# Patient Record
Sex: FEMALE | Race: WHITE | NOT HISPANIC OR LATINO | Employment: OTHER | URBAN - NONMETROPOLITAN AREA
[De-identification: names, ages, dates, MRNs, and addresses within clinical notes are randomized per-mention and may not be internally consistent; named-entity substitution may affect disease eponyms.]

---

## 2017-02-14 RX ORDER — HYDROCHLOROTHIAZIDE 25 MG/1
TABLET ORAL
Qty: 90 TABLET | Refills: 0 | Status: SHIPPED | OUTPATIENT
Start: 2017-02-14 | End: 2018-05-11 | Stop reason: HOSPADM

## 2017-08-11 RX ORDER — HYDROCHLOROTHIAZIDE 25 MG/1
TABLET ORAL
Qty: 90 TABLET | Refills: 0 | OUTPATIENT
Start: 2017-08-11

## 2018-01-01 ENCOUNTER — TELEPHONE (OUTPATIENT)
Dept: FAMILY MEDICINE CLINIC | Facility: CLINIC | Age: 78
End: 2018-01-01

## 2018-01-01 ENCOUNTER — DOCUMENTATION (OUTPATIENT)
Dept: FAMILY MEDICINE CLINIC | Facility: CLINIC | Age: 78
End: 2018-01-01

## 2018-01-01 DIAGNOSIS — K59.00 CONSTIPATION, UNSPECIFIED CONSTIPATION TYPE: Primary | ICD-10-CM

## 2018-01-01 DIAGNOSIS — I10 ESSENTIAL HYPERTENSION: Primary | Chronic | ICD-10-CM

## 2018-01-01 RX ORDER — NYSTATIN 100000 [USP'U]/G
POWDER TOPICAL 2 TIMES DAILY
Qty: 120 G | Refills: 5 | OUTPATIENT
Start: 2018-01-01

## 2018-01-01 RX ORDER — LISINOPRIL 10 MG/1
10 TABLET ORAL
Qty: 90 TABLET | Refills: 3 | Status: SHIPPED | OUTPATIENT
Start: 2018-01-01

## 2018-01-01 RX ORDER — BISACODYL 10 MG
10 SUPPOSITORY, RECTAL RECTAL DAILY
Qty: 28 EACH | Refills: 5 | Status: SHIPPED | OUTPATIENT
Start: 2018-01-01

## 2018-01-01 RX ORDER — POLYETHYLENE GLYCOL 3350 17 G/17G
17 POWDER, FOR SOLUTION ORAL DAILY
Qty: 30 EACH | Refills: 5 | Status: SHIPPED | OUTPATIENT
Start: 2018-01-01

## 2018-01-01 RX ORDER — MIRTAZAPINE 15 MG/1
TABLET, FILM COATED ORAL
Qty: 30 TABLET | Refills: 5 | OUTPATIENT
Start: 2018-01-01

## 2018-01-01 RX ORDER — MIRABEGRON 25 MG/1
25 TABLET, FILM COATED, EXTENDED RELEASE ORAL DAILY
Qty: 30 TABLET | Refills: 5 | OUTPATIENT
Start: 2018-01-01

## 2018-01-01 RX ORDER — NYSTATIN 100000 U/G
CREAM TOPICAL
Qty: 30 G | Refills: 5 | OUTPATIENT
Start: 2018-01-01

## 2018-02-02 RX ORDER — PEN NEEDLE, DIABETIC 31 GX3/16"
NEEDLE, DISPOSABLE MISCELLANEOUS
Refills: 11 | OUTPATIENT
Start: 2018-02-02

## 2018-04-29 ENCOUNTER — HOSPITAL ENCOUNTER (INPATIENT)
Facility: HOSPITAL | Age: 78
LOS: 12 days | Discharge: HOME HEALTH | End: 2018-05-11
Attending: INTERNAL MEDICINE | Admitting: FAMILY MEDICINE

## 2018-04-29 DIAGNOSIS — I11.9 BENIGN HYPERTENSIVE HEART DISEASE WITHOUT HEART FAILURE: ICD-10-CM

## 2018-04-29 DIAGNOSIS — Z78.9 IMPAIRED MOBILITY AND ADLS: ICD-10-CM

## 2018-04-29 DIAGNOSIS — I10 BENIGN HYPERTENSION: ICD-10-CM

## 2018-04-29 DIAGNOSIS — R07.9 CHEST PAIN, UNSPECIFIED TYPE: ICD-10-CM

## 2018-04-29 DIAGNOSIS — Z74.09 IMPAIRED PHYSICAL MOBILITY: ICD-10-CM

## 2018-04-29 DIAGNOSIS — R33.9 CHRONIC RETENTION OF URINE: Primary | ICD-10-CM

## 2018-04-29 DIAGNOSIS — A41.4 SEPSIS DUE TO KLEBSIELLA PNEUMONIAE (HCC): ICD-10-CM

## 2018-04-29 DIAGNOSIS — Z74.09 IMPAIRED MOBILITY AND ADLS: ICD-10-CM

## 2018-04-29 PROBLEM — E66.9 OBESITY: Chronic | Status: ACTIVE | Noted: 2018-04-29

## 2018-04-29 PROBLEM — A41.9 SEPTIC SHOCK (HCC): Status: ACTIVE | Noted: 2018-04-29

## 2018-04-29 PROBLEM — N18.30 CKD (CHRONIC KIDNEY DISEASE) STAGE 3, GFR 30-59 ML/MIN (HCC): Chronic | Status: ACTIVE | Noted: 2018-04-29

## 2018-04-29 PROBLEM — A41.9 SEPSIS (HCC): Status: ACTIVE | Noted: 2018-04-29

## 2018-04-29 PROBLEM — N39.0 UTI (URINARY TRACT INFECTION): Status: ACTIVE | Noted: 2018-04-29

## 2018-04-29 PROBLEM — E11.9 DIABETES MELLITUS (HCC): Chronic | Status: ACTIVE | Noted: 2018-04-29

## 2018-04-29 PROBLEM — R65.21 SEPTIC SHOCK (HCC): Status: ACTIVE | Noted: 2018-04-29

## 2018-04-29 PROCEDURE — 94799 UNLISTED PULMONARY SVC/PX: CPT

## 2018-04-29 PROCEDURE — 94760 N-INVAS EAR/PLS OXIMETRY 1: CPT

## 2018-04-29 RX ORDER — QUETIAPINE FUMARATE 25 MG/1
25 TABLET, FILM COATED ORAL 2 TIMES DAILY
COMMUNITY
Start: 2018-03-30 | End: 2018-05-11 | Stop reason: HOSPADM

## 2018-04-29 RX ORDER — TAMSULOSIN HYDROCHLORIDE 0.4 MG/1
0.4 CAPSULE ORAL DAILY
COMMUNITY
Start: 2018-04-27 | End: 2018-05-11 | Stop reason: HOSPADM

## 2018-04-29 RX ORDER — MAGNESIUM OXIDE 400 MG/1
800 TABLET ORAL 2 TIMES DAILY
Status: ON HOLD | COMMUNITY
End: 2018-04-30

## 2018-04-29 RX ORDER — HYDROCHLOROTHIAZIDE 25 MG/1
25 TABLET ORAL DAILY
COMMUNITY
Start: 2018-02-16 | End: 2018-05-11 | Stop reason: HOSPADM

## 2018-04-29 RX ORDER — CITALOPRAM 20 MG/1
20 TABLET ORAL DAILY
COMMUNITY
Start: 2018-03-23 | End: 2018-05-11 | Stop reason: HOSPADM

## 2018-04-29 RX ADMIN — NOREPINEPHRINE BITARTRATE 0.3 MCG/KG/MIN: 1 INJECTION INTRAVENOUS at 23:29

## 2018-04-30 ENCOUNTER — APPOINTMENT (OUTPATIENT)
Dept: ULTRASOUND IMAGING | Facility: HOSPITAL | Age: 78
End: 2018-04-30

## 2018-04-30 PROBLEM — E11.9 LONG-TERM INSULIN USE IN TYPE 2 DIABETES (HCC): Status: ACTIVE | Noted: 2018-04-29

## 2018-04-30 PROBLEM — B96.1 BACTEREMIA DUE TO KLEBSIELLA PNEUMONIAE: Status: ACTIVE | Noted: 2018-04-30

## 2018-04-30 PROBLEM — N17.9 ACUTE ON CHRONIC RENAL FAILURE (HCC): Chronic | Status: ACTIVE | Noted: 2018-04-30

## 2018-04-30 PROBLEM — R78.81 BACTEREMIA DUE TO KLEBSIELLA PNEUMONIAE: Status: ACTIVE | Noted: 2018-04-30

## 2018-04-30 PROBLEM — N18.9 ACUTE ON CHRONIC RENAL FAILURE (HCC): Chronic | Status: ACTIVE | Noted: 2018-04-30

## 2018-04-30 PROBLEM — Z79.4 LONG-TERM INSULIN USE IN TYPE 2 DIABETES (HCC): Status: ACTIVE | Noted: 2018-04-29

## 2018-04-30 LAB
ALBUMIN SERPL-MCNC: 2.6 G/DL (ref 3.4–4.8)
ALBUMIN SERPL-MCNC: 2.7 G/DL (ref 3.4–4.8)
ALBUMIN/GLOB SERPL: 0.8 G/DL (ref 1.1–1.8)
ALBUMIN/GLOB SERPL: 0.8 G/DL (ref 1.1–1.8)
ALP SERPL-CCNC: 68 U/L (ref 38–126)
ALP SERPL-CCNC: 74 U/L (ref 38–126)
ALT SERPL W P-5'-P-CCNC: 12 U/L (ref 9–52)
ALT SERPL W P-5'-P-CCNC: 30 U/L (ref 9–52)
ANION GAP SERPL CALCULATED.3IONS-SCNC: 14 MMOL/L (ref 5–15)
ANION GAP SERPL CALCULATED.3IONS-SCNC: 17 MMOL/L (ref 5–15)
AST SERPL-CCNC: 28 U/L (ref 14–36)
AST SERPL-CCNC: 34 U/L (ref 14–36)
BACTERIA BLD CULT: ABNORMAL
BACTERIA UR QL AUTO: ABNORMAL /HPF
BASOPHILS # BLD AUTO: 0.06 10*3/MM3 (ref 0–0.2)
BASOPHILS NFR BLD AUTO: 0.2 % (ref 0–2)
BILIRUB SERPL-MCNC: 0.4 MG/DL (ref 0.2–1.3)
BILIRUB SERPL-MCNC: 0.4 MG/DL (ref 0.2–1.3)
BILIRUB UR QL STRIP: ABNORMAL
BUN BLD-MCNC: 46 MG/DL (ref 7–21)
BUN BLD-MCNC: 50 MG/DL (ref 7–21)
BUN/CREAT SERPL: 18.9 (ref 7–25)
BUN/CREAT SERPL: 22.5 (ref 7–25)
CALCIUM SPEC-SCNC: 8.6 MG/DL (ref 8.4–10.2)
CALCIUM SPEC-SCNC: 8.7 MG/DL (ref 8.4–10.2)
CHLORIDE SERPL-SCNC: 103 MMOL/L (ref 95–110)
CHLORIDE SERPL-SCNC: 105 MMOL/L (ref 95–110)
CLARITY UR: ABNORMAL
CO2 SERPL-SCNC: 18 MMOL/L (ref 22–31)
CO2 SERPL-SCNC: 18 MMOL/L (ref 22–31)
COLOR UR: ABNORMAL
CREAT BLD-MCNC: 2.22 MG/DL (ref 0.5–1)
CREAT BLD-MCNC: 2.44 MG/DL (ref 0.5–1)
D-LACTATE SERPL-SCNC: 3.7 MMOL/L (ref 0.5–2)
D-LACTATE SERPL-SCNC: 5 MMOL/L (ref 0.5–2)
D-LACTATE SERPL-SCNC: 6.2 MMOL/L (ref 0.5–2)
DEPRECATED RDW RBC AUTO: 41.4 FL (ref 36.4–46.3)
DEPRECATED RDW RBC AUTO: 41.7 FL (ref 36.4–46.3)
EOSINOPHIL # BLD AUTO: 0.01 10*3/MM3 (ref 0–0.7)
EOSINOPHIL NFR BLD AUTO: 0 % (ref 0–7)
ERYTHROCYTE [DISTWIDTH] IN BLOOD BY AUTOMATED COUNT: 12.6 % (ref 11.5–14.5)
ERYTHROCYTE [DISTWIDTH] IN BLOOD BY AUTOMATED COUNT: 12.8 % (ref 11.5–14.5)
GFR SERPL CREATININE-BSD FRML MDRD: 19 ML/MIN/1.73 (ref 60–90)
GFR SERPL CREATININE-BSD FRML MDRD: 22 ML/MIN/1.73 (ref 39–90)
GLOBULIN UR ELPH-MCNC: 3.1 GM/DL (ref 2.3–3.5)
GLOBULIN UR ELPH-MCNC: 3.3 GM/DL (ref 2.3–3.5)
GLUCOSE BLD-MCNC: 248 MG/DL (ref 60–100)
GLUCOSE BLD-MCNC: 97 MG/DL (ref 60–100)
GLUCOSE BLDC GLUCOMTR-MCNC: 165 MG/DL (ref 70–130)
GLUCOSE BLDC GLUCOMTR-MCNC: 226 MG/DL (ref 70–130)
GLUCOSE BLDC GLUCOMTR-MCNC: 271 MG/DL (ref 70–130)
GLUCOSE BLDC GLUCOMTR-MCNC: 59 MG/DL (ref 70–130)
GLUCOSE BLDC GLUCOMTR-MCNC: 91 MG/DL (ref 70–130)
GLUCOSE UR STRIP-MCNC: NEGATIVE MG/DL
HCT VFR BLD AUTO: 36.8 % (ref 35–45)
HCT VFR BLD AUTO: 37 % (ref 35–45)
HGB BLD-MCNC: 12.5 G/DL (ref 12–15.5)
HGB BLD-MCNC: 12.5 G/DL (ref 12–15.5)
HGB UR QL STRIP.AUTO: ABNORMAL
HOLD SPECIMEN: NORMAL
HYALINE CASTS UR QL AUTO: ABNORMAL /LPF
IMM GRANULOCYTES # BLD: 3.24 10*3/MM3 (ref 0–0.02)
IMM GRANULOCYTES NFR BLD: 8.3 % (ref 0–0.5)
INR PPP: 1.41 (ref 0.8–1.2)
INR PPP: 1.44 (ref 0.8–1.2)
KETONES UR QL STRIP: ABNORMAL
LEUKOCYTE ESTERASE UR QL STRIP.AUTO: ABNORMAL
LYMPHOCYTES # BLD AUTO: 1.46 10*3/MM3 (ref 0.6–4.2)
LYMPHOCYTES # BLD MANUAL: 0.91 10*3/MM3 (ref 0.6–4.2)
LYMPHOCYTES NFR BLD AUTO: 3.7 % (ref 10–50)
LYMPHOCYTES NFR BLD MANUAL: 11 % (ref 0–12)
LYMPHOCYTES NFR BLD MANUAL: 2 % (ref 10–50)
MAGNESIUM SERPL-MCNC: 1.1 MG/DL (ref 1.6–2.3)
MAGNESIUM SERPL-MCNC: 1.6 MG/DL (ref 1.6–2.3)
MCH RBC QN AUTO: 30.3 PG (ref 26.5–34)
MCH RBC QN AUTO: 30.6 PG (ref 26.5–34)
MCHC RBC AUTO-ENTMCNC: 33.8 G/DL (ref 31.4–36)
MCHC RBC AUTO-ENTMCNC: 34 G/DL (ref 31.4–36)
MCV RBC AUTO: 89.8 FL (ref 80–98)
MCV RBC AUTO: 90 FL (ref 80–98)
METAMYELOCYTES NFR BLD MANUAL: 2 % (ref 0–0)
MONOCYTES # BLD AUTO: 3.24 10*3/MM3 (ref 0–0.9)
MONOCYTES # BLD AUTO: 5.02 10*3/MM3 (ref 0–0.9)
MONOCYTES NFR BLD AUTO: 8.3 % (ref 0–12)
NEUTROPHILS # BLD AUTO: 31 10*3/MM3 (ref 2–8.6)
NEUTROPHILS # BLD AUTO: 38.83 10*3/MM3 (ref 2–8.6)
NEUTROPHILS NFR BLD AUTO: 79.5 % (ref 37–80)
NEUTROPHILS NFR BLD MANUAL: 68 % (ref 37–80)
NEUTS BAND NFR BLD MANUAL: 17 % (ref 0–5)
NITRITE UR QL STRIP: POSITIVE
PH UR STRIP.AUTO: 5.5 [PH] (ref 5–9)
PLAT MORPH BLD: NORMAL
PLATELET # BLD AUTO: 178 10*3/MM3 (ref 150–450)
PLATELET # BLD AUTO: 198 10*3/MM3 (ref 150–450)
PMV BLD AUTO: 11.5 FL (ref 8–12)
PMV BLD AUTO: 11.6 FL (ref 8–12)
POTASSIUM BLD-SCNC: 3.5 MMOL/L (ref 3.5–5.1)
POTASSIUM BLD-SCNC: 3.7 MMOL/L (ref 3.5–5.1)
PROT SERPL-MCNC: 5.7 G/DL (ref 6.3–8.6)
PROT SERPL-MCNC: 6 G/DL (ref 6.3–8.6)
PROT UR QL STRIP: ABNORMAL
PROTHROMBIN TIME: 16.8 SECONDS (ref 11.1–15.3)
PROTHROMBIN TIME: 17.1 SECONDS (ref 11.1–15.3)
RBC # BLD AUTO: 4.09 10*6/MM3 (ref 3.77–5.16)
RBC # BLD AUTO: 4.12 10*6/MM3 (ref 3.77–5.16)
RBC # UR: ABNORMAL /HPF
RBC MORPH BLD: NORMAL
REF LAB TEST METHOD: ABNORMAL
SODIUM BLD-SCNC: 137 MMOL/L (ref 137–145)
SODIUM BLD-SCNC: 138 MMOL/L (ref 137–145)
SP GR UR STRIP: 1.02 (ref 1–1.03)
SQUAMOUS #/AREA URNS HPF: ABNORMAL /HPF
UROBILINOGEN UR QL STRIP: ABNORMAL
WBC MORPH BLD: NORMAL
WBC NRBC COR # BLD: 39.01 10*3/MM3 (ref 3.2–9.8)
WBC NRBC COR # BLD: 45.68 10*3/MM3 (ref 3.2–9.8)
WBC UR QL AUTO: ABNORMAL /HPF

## 2018-04-30 PROCEDURE — 25010000002 MORPHINE PER 10 MG: Performed by: INTERNAL MEDICINE

## 2018-04-30 PROCEDURE — 83735 ASSAY OF MAGNESIUM: CPT | Performed by: INTERNAL MEDICINE

## 2018-04-30 PROCEDURE — 80053 COMPREHEN METABOLIC PANEL: CPT | Performed by: INTERNAL MEDICINE

## 2018-04-30 PROCEDURE — 25010000002 MAGNESIUM SULFATE 2 GM/50ML SOLUTION: Performed by: FAMILY MEDICINE

## 2018-04-30 PROCEDURE — 92610 EVALUATE SWALLOWING FUNCTION: CPT | Performed by: SPEECH-LANGUAGE PATHOLOGIST

## 2018-04-30 PROCEDURE — 83735 ASSAY OF MAGNESIUM: CPT | Performed by: FAMILY MEDICINE

## 2018-04-30 PROCEDURE — 83605 ASSAY OF LACTIC ACID: CPT | Performed by: INTERNAL MEDICINE

## 2018-04-30 PROCEDURE — 99232 SBSQ HOSP IP/OBS MODERATE 35: CPT | Performed by: FAMILY MEDICINE

## 2018-04-30 PROCEDURE — 87186 SC STD MICRODIL/AGAR DIL: CPT | Performed by: INTERNAL MEDICINE

## 2018-04-30 PROCEDURE — 87150 DNA/RNA AMPLIFIED PROBE: CPT | Performed by: INTERNAL MEDICINE

## 2018-04-30 PROCEDURE — 63710000001 INSULIN DETEMIR PER 5 UNITS: Performed by: INTERNAL MEDICINE

## 2018-04-30 PROCEDURE — 81001 URINALYSIS AUTO W/SCOPE: CPT | Performed by: FAMILY MEDICINE

## 2018-04-30 PROCEDURE — 76775 US EXAM ABDO BACK WALL LIM: CPT

## 2018-04-30 PROCEDURE — 85610 PROTHROMBIN TIME: CPT | Performed by: INTERNAL MEDICINE

## 2018-04-30 PROCEDURE — 83605 ASSAY OF LACTIC ACID: CPT | Performed by: FAMILY MEDICINE

## 2018-04-30 PROCEDURE — 85007 BL SMEAR W/DIFF WBC COUNT: CPT | Performed by: INTERNAL MEDICINE

## 2018-04-30 PROCEDURE — 80053 COMPREHEN METABOLIC PANEL: CPT | Performed by: FAMILY MEDICINE

## 2018-04-30 PROCEDURE — 87040 BLOOD CULTURE FOR BACTERIA: CPT | Performed by: INTERNAL MEDICINE

## 2018-04-30 PROCEDURE — 82962 GLUCOSE BLOOD TEST: CPT

## 2018-04-30 PROCEDURE — 85025 COMPLETE CBC W/AUTO DIFF WBC: CPT | Performed by: FAMILY MEDICINE

## 2018-04-30 PROCEDURE — 25010000002 PIPERACILLIN SOD-TAZOBACTAM PER 1 G: Performed by: INTERNAL MEDICINE

## 2018-04-30 PROCEDURE — 25010000002 LEVOFLOXACIN PER 250 MG: Performed by: FAMILY MEDICINE

## 2018-04-30 PROCEDURE — 87077 CULTURE AEROBIC IDENTIFY: CPT | Performed by: INTERNAL MEDICINE

## 2018-04-30 PROCEDURE — 63710000001 INSULIN ASPART PER 5 UNITS: Performed by: INTERNAL MEDICINE

## 2018-04-30 PROCEDURE — 85025 COMPLETE CBC W/AUTO DIFF WBC: CPT | Performed by: INTERNAL MEDICINE

## 2018-04-30 PROCEDURE — 87086 URINE CULTURE/COLONY COUNT: CPT | Performed by: INTERNAL MEDICINE

## 2018-04-30 RX ORDER — SODIUM CHLORIDE 0.9 % (FLUSH) 0.9 %
1-10 SYRINGE (ML) INJECTION AS NEEDED
Status: DISCONTINUED | OUTPATIENT
Start: 2018-04-29 | End: 2018-05-11 | Stop reason: HOSPADM

## 2018-04-30 RX ORDER — NYSTATIN 100000 [USP'U]/G
POWDER TOPICAL EVERY 12 HOURS SCHEDULED
Status: DISCONTINUED | OUTPATIENT
Start: 2018-04-30 | End: 2018-05-11 | Stop reason: HOSPADM

## 2018-04-30 RX ORDER — LEVOFLOXACIN 5 MG/ML
250 INJECTION, SOLUTION INTRAVENOUS EVERY 24 HOURS
Status: DISCONTINUED | OUTPATIENT
Start: 2018-05-01 | End: 2018-05-04

## 2018-04-30 RX ORDER — WARFARIN SODIUM 2.5 MG/1
2.5 TABLET ORAL NIGHTLY
Status: DISCONTINUED | OUTPATIENT
Start: 2018-04-30 | End: 2018-04-30

## 2018-04-30 RX ORDER — SODIUM CHLORIDE 9 MG/ML
200 INJECTION, SOLUTION INTRAVENOUS CONTINUOUS
Status: DISCONTINUED | OUTPATIENT
Start: 2018-04-30 | End: 2018-05-01

## 2018-04-30 RX ORDER — DEXTROSE MONOHYDRATE 25 G/50ML
25 INJECTION, SOLUTION INTRAVENOUS
Status: DISCONTINUED | OUTPATIENT
Start: 2018-04-29 | End: 2018-05-11 | Stop reason: HOSPADM

## 2018-04-30 RX ORDER — NALOXONE HCL 0.4 MG/ML
0.4 VIAL (ML) INJECTION
Status: DISCONTINUED | OUTPATIENT
Start: 2018-04-30 | End: 2018-05-10

## 2018-04-30 RX ORDER — MAGNESIUM SULFATE HEPTAHYDRATE 40 MG/ML
2 INJECTION, SOLUTION INTRAVENOUS ONCE
Status: COMPLETED | OUTPATIENT
Start: 2018-04-30 | End: 2018-04-30

## 2018-04-30 RX ORDER — ACETAMINOPHEN 325 MG/1
650 TABLET ORAL EVERY 6 HOURS PRN
Status: DISCONTINUED | OUTPATIENT
Start: 2018-04-30 | End: 2018-05-11 | Stop reason: HOSPADM

## 2018-04-30 RX ORDER — LEVOFLOXACIN 5 MG/ML
500 INJECTION, SOLUTION INTRAVENOUS ONCE
Status: COMPLETED | OUTPATIENT
Start: 2018-04-30 | End: 2018-04-30

## 2018-04-30 RX ORDER — NICOTINE POLACRILEX 4 MG
15 LOZENGE BUCCAL
Status: DISCONTINUED | OUTPATIENT
Start: 2018-04-29 | End: 2018-05-11 | Stop reason: HOSPADM

## 2018-04-30 RX ADMIN — SODIUM CHLORIDE 200 ML/HR: 900 INJECTION, SOLUTION INTRAVENOUS at 11:04

## 2018-04-30 RX ADMIN — TAZOBACTAM SODIUM AND PIPERACILLIN SODIUM 2.25 G: 250; 2 INJECTION, SOLUTION INTRAVENOUS at 05:46

## 2018-04-30 RX ADMIN — TAZOBACTAM SODIUM AND PIPERACILLIN SODIUM 3.38 G: 375; 3 INJECTION, SOLUTION INTRAVENOUS at 00:35

## 2018-04-30 RX ADMIN — SODIUM CHLORIDE 200 ML/HR: 900 INJECTION, SOLUTION INTRAVENOUS at 05:45

## 2018-04-30 RX ADMIN — SODIUM CHLORIDE 200 ML/HR: 900 INJECTION, SOLUTION INTRAVENOUS at 23:53

## 2018-04-30 RX ADMIN — MAGNESIUM SULFATE HEPTAHYDRATE 2 G: 40 INJECTION, SOLUTION INTRAVENOUS at 11:04

## 2018-04-30 RX ADMIN — TAZOBACTAM SODIUM AND PIPERACILLIN SODIUM 2.25 G: 250; 2 INJECTION, SOLUTION INTRAVENOUS at 23:45

## 2018-04-30 RX ADMIN — SODIUM CHLORIDE 200 ML/HR: 900 INJECTION, SOLUTION INTRAVENOUS at 00:31

## 2018-04-30 RX ADMIN — DEXTROSE MONOHYDRATE 25 G: 500 INJECTION PARENTERAL at 20:16

## 2018-04-30 RX ADMIN — SODIUM CHLORIDE 200 ML/HR: 900 INJECTION, SOLUTION INTRAVENOUS at 18:16

## 2018-04-30 RX ADMIN — MORPHINE SULFATE 1 MG: 4 INJECTION, SOLUTION INTRAMUSCULAR; INTRAVENOUS at 20:16

## 2018-04-30 RX ADMIN — NYSTATIN: 100000 POWDER TOPICAL at 00:34

## 2018-04-30 RX ADMIN — INSULIN DETEMIR 30 UNITS: 100 INJECTION, SOLUTION SUBCUTANEOUS at 08:02

## 2018-04-30 RX ADMIN — TAZOBACTAM SODIUM AND PIPERACILLIN SODIUM 2.25 G: 250; 2 INJECTION, SOLUTION INTRAVENOUS at 15:34

## 2018-04-30 RX ADMIN — HYDROCORTISONE: 1 CREAM TOPICAL at 11:04

## 2018-04-30 RX ADMIN — INSULIN ASPART 8 UNITS: 100 INJECTION, SOLUTION INTRAVENOUS; SUBCUTANEOUS at 00:35

## 2018-04-30 RX ADMIN — INSULIN ASPART 5 UNITS: 100 INJECTION, SOLUTION INTRAVENOUS; SUBCUTANEOUS at 08:02

## 2018-04-30 RX ADMIN — INSULIN ASPART 3 UNITS: 100 INJECTION, SOLUTION INTRAVENOUS; SUBCUTANEOUS at 11:04

## 2018-04-30 RX ADMIN — LEVOFLOXACIN 500 MG: 5 INJECTION, SOLUTION INTRAVENOUS at 16:31

## 2018-04-30 RX ADMIN — MORPHINE SULFATE 1 MG: 4 INJECTION, SOLUTION INTRAMUSCULAR; INTRAVENOUS at 14:24

## 2018-04-30 RX ADMIN — INSULIN DETEMIR 30 UNITS: 100 INJECTION, SOLUTION SUBCUTANEOUS at 00:35

## 2018-04-30 RX ADMIN — NYSTATIN: 100000 POWDER TOPICAL at 08:02

## 2018-04-30 RX ADMIN — NYSTATIN: 100000 POWDER TOPICAL at 20:18

## 2018-04-30 RX ADMIN — HYDROCORTISONE: 1 CREAM TOPICAL at 20:16

## 2018-05-01 PROBLEM — A41.9 SEPTIC SHOCK (HCC): Status: RESOLVED | Noted: 2018-04-29 | Resolved: 2018-05-01

## 2018-05-01 PROBLEM — R65.21 SEPTIC SHOCK (HCC): Status: RESOLVED | Noted: 2018-04-29 | Resolved: 2018-05-01

## 2018-05-01 LAB
ALBUMIN SERPL-MCNC: 2.4 G/DL (ref 3.4–4.8)
ALBUMIN/GLOB SERPL: 0.7 G/DL (ref 1.1–1.8)
ALP SERPL-CCNC: 67 U/L (ref 38–126)
ALT SERPL W P-5'-P-CCNC: 30 U/L (ref 9–52)
ANION GAP SERPL CALCULATED.3IONS-SCNC: 11 MMOL/L (ref 5–15)
AST SERPL-CCNC: 35 U/L (ref 14–36)
BASOPHILS # BLD AUTO: 0.03 10*3/MM3 (ref 0–0.2)
BASOPHILS NFR BLD AUTO: 0.1 % (ref 0–2)
BILIRUB SERPL-MCNC: 0.3 MG/DL (ref 0.2–1.3)
BUN BLD-MCNC: 52 MG/DL (ref 7–21)
BUN/CREAT SERPL: 22.7 (ref 7–25)
CALCIUM SPEC-SCNC: 8.3 MG/DL (ref 8.4–10.2)
CHLORIDE SERPL-SCNC: 111 MMOL/L (ref 95–110)
CO2 SERPL-SCNC: 17 MMOL/L (ref 22–31)
CREAT BLD-MCNC: 2.29 MG/DL (ref 0.5–1)
D-LACTATE SERPL-SCNC: 1.2 MMOL/L (ref 0.5–2)
DEPRECATED RDW RBC AUTO: 43 FL (ref 36.4–46.3)
EOSINOPHIL # BLD AUTO: 0.03 10*3/MM3 (ref 0–0.7)
EOSINOPHIL NFR BLD AUTO: 0.1 % (ref 0–7)
ERYTHROCYTE [DISTWIDTH] IN BLOOD BY AUTOMATED COUNT: 13 % (ref 11.5–14.5)
GFR SERPL CREATININE-BSD FRML MDRD: 21 ML/MIN/1.73 (ref 39–90)
GLOBULIN UR ELPH-MCNC: 3.3 GM/DL (ref 2.3–3.5)
GLUCOSE BLD-MCNC: 59 MG/DL (ref 60–100)
GLUCOSE BLDC GLUCOMTR-MCNC: 111 MG/DL (ref 70–130)
GLUCOSE BLDC GLUCOMTR-MCNC: 120 MG/DL (ref 70–130)
GLUCOSE BLDC GLUCOMTR-MCNC: 174 MG/DL (ref 70–130)
GLUCOSE BLDC GLUCOMTR-MCNC: 185 MG/DL (ref 70–130)
GLUCOSE BLDC GLUCOMTR-MCNC: 65 MG/DL (ref 70–130)
HCT VFR BLD AUTO: 31.6 % (ref 35–45)
HGB BLD-MCNC: 10.5 G/DL (ref 12–15.5)
IMM GRANULOCYTES # BLD: 1.9 10*3/MM3 (ref 0–0.02)
IMM GRANULOCYTES NFR BLD: 5.1 % (ref 0–0.5)
LYMPHOCYTES # BLD AUTO: 1.78 10*3/MM3 (ref 0.6–4.2)
LYMPHOCYTES NFR BLD AUTO: 4.8 % (ref 10–50)
MAGNESIUM SERPL-MCNC: 1.6 MG/DL (ref 1.6–2.3)
MCH RBC QN AUTO: 30.1 PG (ref 26.5–34)
MCHC RBC AUTO-ENTMCNC: 33.2 G/DL (ref 31.4–36)
MCV RBC AUTO: 90.5 FL (ref 80–98)
MONOCYTES # BLD AUTO: 2.45 10*3/MM3 (ref 0–0.9)
MONOCYTES NFR BLD AUTO: 6.6 % (ref 0–12)
NEUTROPHILS # BLD AUTO: 30.97 10*3/MM3 (ref 2–8.6)
NEUTROPHILS NFR BLD AUTO: 83.3 % (ref 37–80)
NRBC BLD MANUAL-RTO: 0 /100 WBC (ref 0–0)
PLATELET # BLD AUTO: 130 10*3/MM3 (ref 150–450)
PMV BLD AUTO: 12 FL (ref 8–12)
POTASSIUM BLD-SCNC: 3.6 MMOL/L (ref 3.5–5.1)
PROT SERPL-MCNC: 5.7 G/DL (ref 6.3–8.6)
RBC # BLD AUTO: 3.49 10*6/MM3 (ref 3.77–5.16)
SODIUM BLD-SCNC: 139 MMOL/L (ref 137–145)
WBC NRBC COR # BLD: 37.16 10*3/MM3 (ref 3.2–9.8)

## 2018-05-01 PROCEDURE — 83735 ASSAY OF MAGNESIUM: CPT | Performed by: FAMILY MEDICINE

## 2018-05-01 PROCEDURE — 63710000001 INSULIN ASPART PER 5 UNITS: Performed by: INTERNAL MEDICINE

## 2018-05-01 PROCEDURE — 82962 GLUCOSE BLOOD TEST: CPT

## 2018-05-01 PROCEDURE — 85025 COMPLETE CBC W/AUTO DIFF WBC: CPT | Performed by: FAMILY MEDICINE

## 2018-05-01 PROCEDURE — 83605 ASSAY OF LACTIC ACID: CPT | Performed by: FAMILY MEDICINE

## 2018-05-01 PROCEDURE — 25010000002 MORPHINE PER 10 MG: Performed by: INTERNAL MEDICINE

## 2018-05-01 PROCEDURE — 92526 ORAL FUNCTION THERAPY: CPT | Performed by: SPEECH-LANGUAGE PATHOLOGIST

## 2018-05-01 PROCEDURE — 94799 UNLISTED PULMONARY SVC/PX: CPT

## 2018-05-01 PROCEDURE — 25010000002 PIPERACILLIN SOD-TAZOBACTAM PER 1 G: Performed by: INTERNAL MEDICINE

## 2018-05-01 PROCEDURE — 80053 COMPREHEN METABOLIC PANEL: CPT | Performed by: FAMILY MEDICINE

## 2018-05-01 PROCEDURE — 94760 N-INVAS EAR/PLS OXIMETRY 1: CPT

## 2018-05-01 PROCEDURE — 99232 SBSQ HOSP IP/OBS MODERATE 35: CPT | Performed by: FAMILY MEDICINE

## 2018-05-01 PROCEDURE — 25010000002 LEVOFLOXACIN PER 250 MG: Performed by: FAMILY MEDICINE

## 2018-05-01 RX ORDER — DEXTROSE, SODIUM CHLORIDE, AND POTASSIUM CHLORIDE 5; .45; .15 G/100ML; G/100ML; G/100ML
50 INJECTION INTRAVENOUS CONTINUOUS
Status: DISCONTINUED | OUTPATIENT
Start: 2018-05-01 | End: 2018-05-04

## 2018-05-01 RX ADMIN — HYDROCORTISONE: 1 CREAM TOPICAL at 21:55

## 2018-05-01 RX ADMIN — INSULIN ASPART 3 UNITS: 100 INJECTION, SOLUTION INTRAVENOUS; SUBCUTANEOUS at 17:29

## 2018-05-01 RX ADMIN — DEXTROSE MONOHYDRATE 25 G: 500 INJECTION PARENTERAL at 05:03

## 2018-05-01 RX ADMIN — NYSTATIN: 100000 POWDER TOPICAL at 21:55

## 2018-05-01 RX ADMIN — POTASSIUM CHLORIDE, DEXTROSE MONOHYDRATE AND SODIUM CHLORIDE 150 ML/HR: 150; 5; 450 INJECTION, SOLUTION INTRAVENOUS at 08:03

## 2018-05-01 RX ADMIN — LEVOFLOXACIN 250 MG: 5 INJECTION, SOLUTION INTRAVENOUS at 15:47

## 2018-05-01 RX ADMIN — SODIUM CHLORIDE 200 ML/HR: 900 INJECTION, SOLUTION INTRAVENOUS at 06:52

## 2018-05-01 RX ADMIN — POTASSIUM CHLORIDE, DEXTROSE MONOHYDRATE AND SODIUM CHLORIDE 150 ML/HR: 150; 5; 450 INJECTION, SOLUTION INTRAVENOUS at 14:56

## 2018-05-01 RX ADMIN — NYSTATIN: 100000 POWDER TOPICAL at 08:03

## 2018-05-01 RX ADMIN — TAZOBACTAM SODIUM AND PIPERACILLIN SODIUM 2.25 G: 250; 2 INJECTION, SOLUTION INTRAVENOUS at 22:22

## 2018-05-01 RX ADMIN — HYDROCORTISONE: 1 CREAM TOPICAL at 08:03

## 2018-05-01 RX ADMIN — INSULIN ASPART 3 UNITS: 100 INJECTION, SOLUTION INTRAVENOUS; SUBCUTANEOUS at 20:38

## 2018-05-01 RX ADMIN — MORPHINE SULFATE 1 MG: 4 INJECTION, SOLUTION INTRAMUSCULAR; INTRAVENOUS at 00:22

## 2018-05-01 RX ADMIN — TAZOBACTAM SODIUM AND PIPERACILLIN SODIUM 2.25 G: 250; 2 INJECTION, SOLUTION INTRAVENOUS at 06:53

## 2018-05-01 RX ADMIN — TAZOBACTAM SODIUM AND PIPERACILLIN SODIUM 2.25 G: 250; 2 INJECTION, SOLUTION INTRAVENOUS at 14:57

## 2018-05-01 NOTE — PROGRESS NOTES
FAMILY MEDICINE PROGRESS NOTE  NAME: Tessy Gonzalez  : 1940  MRN: [id number]     LOS: 1 day   Conditional Code  PROVIDER OF SERVICE:     Chief Complaint:  Septic shock    Subjective     Interval History:  History taken from: patient chart RN  Subjective: Patient is a 78 yo  female who was admitted with septic shock.  She was originally on Levophed but currently off.  She isn't making much urine.  Anytime you touch her she screams per nursing.  Family asked for me to take over her care.    Review of Systems:   Review of Systems   Unable to perform ROS: Mental status change       Objective     Vital Signs  Temp:  [97 °F (36.1 °C)-99.8 °F (37.7 °C)] 98 °F (36.7 °C)  Heart Rate:  [] 104  Resp:  [16-24] 16  BP: ()/(45-60) 105/51    Physical Exam  Physical Exam   Constitutional: She appears well-developed and well-nourished. No distress.   Appears to be ill   Cardiovascular: Normal rate, regular rhythm, normal heart sounds and intact distal pulses.  Exam reveals no gallop and no friction rub.    No murmur heard.  Pulmonary/Chest: No respiratory distress. She has no wheezes. She has no rales.   Diminished breath sounds in the bases, poor inspiratory effort   Abdominal: Soft. Bowel sounds are normal. She exhibits no distension. There is no tenderness. There is no rebound and no guarding.   Musculoskeletal: She exhibits deformity.   Right lower leg lateral aspect large groove   Neurological:   Lethargic, oriented to place only   Skin: She is not diaphoretic.   Nursing note and vitals reviewed.      Medication Review    Current Facility-Administered Medications:   •  acetaminophen (TYLENOL) tablet 650 mg, 650 mg, Oral, Q6H PRN, Jason Beckman MD  •  dextrose (D50W) solution 25 g, 25 g, Intravenous, Q15 Min PRN, Jason Beckman MD, 25 g at 18  •  dextrose (GLUTOSE) oral gel 15 g, 15 g, Oral, Q15 Min PRN, Jason Beckman MD  •  glucagon (human recombinant) (GLUCAGEN DIAGNOSTIC) injection 1  mg, 1 mg, Subcutaneous, PRN, Jason Beckman MD  •  insulin aspart (novoLOG) injection 0-14 Units, 0-14 Units, Subcutaneous, 4x Daily AC & at Bedtime, Jason Beckman MD, 3 Units at 04/30/18 1104  •  [START ON 5/1/2018] levoFLOXacin (LEVAQUIN) 250 mg/50 mL D5W (premix) 250 mg, 250 mg, Intravenous, Q24H, Kulwinder Hernandez MD  •  magic butt ointment, , Topical, BID, Kulwinder Hernandez MD  •  morphine injection 1 mg, 1 mg, Intravenous, Q4H PRN, 1 mg at 04/30/18 2016 **AND** naloxone (NARCAN) injection 0.4 mg, 0.4 mg, Intravenous, Q5 Min PRN, Jason Beckman MD  •  norepinephrine (LEVOPHED) 8,000 mcg in sodium chloride 0.9 % 250 mL (32 mcg/mL) infusion, 0.02-0.3 mcg/kg/min, Intravenous, Titrated, Jason Beckman MD, Stopped at 04/30/18 0607  •  nystatin (MYCOSTATIN) powder, , Topical, Q12H, Jason Beckman MD  •  Pharmacy to Dose LevoFLOXacin (LEVAQUIN), , Does not apply, Continuous PRN, Kulwinder Hernandez MD  •  piperacillin-tazobactam (ZOSYN) in iso-osmotic dextrose IVPB 2.25 g (premix), 2.25 g, Intravenous, Q8H, Jason Beckman MD, 2.25 g at 04/30/18 1534  •  sodium chloride 0.9 % flush 1-10 mL, 1-10 mL, Intravenous, PRN, Jason Beckman MD  •  sodium chloride 0.9 % infusion, 200 mL/hr, Intravenous, Continuous, Jason Beckman MD, Last Rate: 200 mL/hr at 04/30/18 1816, 200 mL/hr at 04/30/18 1816     Diagnostic Data      I reviewed the patient's new clinical results and imaging.      Assessment/Plan     Active Hospital Problems (** Indicates Principal Problem)    Diagnosis Date Noted   • **Septic shock [A41.9, R65.21] 04/29/2018   • Acute on chronic renal failure [N17.9, N18.9] 04/30/2018   • Bacteremia due to Klebsiella pneumoniae [R78.81] 04/30/2018   • Sepsis [A41.9] 04/29/2018   • UTI (urinary tract infection) [N39.0] 04/29/2018   • Long-term insulin use in type 2 diabetes [E11.9, Z79.4] 04/29/2018   • Morbid obesity [E66.01]       Resolved Hospital Problems    Diagnosis Date Noted Date Resolved   No resolved problems to display.      Wait for cultures on Zosyn and Levaquin.  Monitor urine output closely, consult nephrology as necessary.      DVT prophylaxis: SCDs/TEDs      Disposition:uncertain at this point          This document has been electronically signed by Glo Kirk MD on April 30, 2018 10:45 PM

## 2018-05-01 NOTE — PROGRESS NOTES
FAMILY MEDICINE PROGRESS NOTE  NAME: Tessy Gonzalez  : 1940  MRN: [id number]     LOS: 2 days   Conditional Code  PROVIDER OF SERVICE:     Chief Complaint:  Sepsis    Subjective     Interval History:  History taken from: patient chart RN  Subjective: Patient is a 78 yo  female who was admitted with septic shock.  She is peeing better.    Review of Systems:   Review of Systems   Unable to perform ROS: Mental status change       Objective     Vital Signs  Temp:  [97.5 °F (36.4 °C)-98.5 °F (36.9 °C)] 98.5 °F (36.9 °C)  Heart Rate:  [] 85  Resp:  [16-20] 18  BP: ()/(49-77) 118/58    Physical Exam  Physical Exam   Constitutional: She appears well-developed and well-nourished. No distress.   Appears to be ill   Cardiovascular: Normal rate, regular rhythm, normal heart sounds and intact distal pulses.  Exam reveals no gallop and no friction rub.    No murmur heard.  Pulmonary/Chest: No respiratory distress. She has no wheezes. She has no rales.   Diminished breath sounds in the bases, poor inspiratory effort   Abdominal: Soft. Bowel sounds are normal. She exhibits no distension. There is no tenderness. There is no rebound and no guarding.   Musculoskeletal: She exhibits deformity.   Right lower leg lateral aspect large groove   Neurological:   oriented to place and person only, more awake   Skin: She is not diaphoretic.   Nursing note and vitals reviewed.      Medication Review    Current Facility-Administered Medications:   •  acetaminophen (TYLENOL) tablet 650 mg, 650 mg, Oral, Q6H PRN, Jason Beckman MD  •  dextrose (D50W) solution 25 g, 25 g, Intravenous, Q15 Min PRN, Jason Beckman MD, 25 g at 18 0503  •  dextrose (GLUTOSE) oral gel 15 g, 15 g, Oral, Q15 Min PRN, Jason Beckman MD  •  dextrose 5 % and sodium chloride 0.45 % with KCl 20 mEq/L infusion, 150 mL/hr, Intravenous, Continuous, Glo Kirk MD, Last Rate: 150 mL/hr at 18 1456, 150 mL/hr at 18 1456  •  glucagon  (human recombinant) (GLUCAGEN DIAGNOSTIC) injection 1 mg, 1 mg, Subcutaneous, PRN, Jason Beckman MD  •  insulin aspart (novoLOG) injection 0-14 Units, 0-14 Units, Subcutaneous, 4x Daily AC & at Bedtime, Jason Beckman MD, 3 Units at 04/30/18 1104  •  levoFLOXacin (LEVAQUIN) 250 mg/50 mL D5W (premix) 250 mg, 250 mg, Intravenous, Q24H, Kulwinder Hernandez MD  •  magic butt ointment, , Topical, BID, Kulwinder Hernandez MD  •  morphine injection 1 mg, 1 mg, Intravenous, Q4H PRN, 1 mg at 05/01/18 0022 **AND** naloxone (NARCAN) injection 0.4 mg, 0.4 mg, Intravenous, Q5 Min PRN, Jason Beckman MD  •  norepinephrine (LEVOPHED) 8,000 mcg in sodium chloride 0.9 % 250 mL (32 mcg/mL) infusion, 0.02-0.3 mcg/kg/min, Intravenous, Titrated, Jason Beckman MD, Stopped at 04/30/18 0607  •  nystatin (MYCOSTATIN) powder, , Topical, Q12H, Jason Beckman MD  •  Pharmacy to Dose LevoFLOXacin (LEVAQUIN), , Does not apply, Continuous PRN, Kulwinder Hernandez MD  •  piperacillin-tazobactam (ZOSYN) in iso-osmotic dextrose IVPB 2.25 g (premix), 2.25 g, Intravenous, Q8H, Jason Beckman MD, Stopped at 05/01/18 1108  •  sodium chloride 0.9 % flush 1-10 mL, 1-10 mL, Intravenous, PRN, Jason Beckman MD     Diagnostic Data      I reviewed the patient's new clinical results and imaging.      Assessment/Plan     Active Hospital Problems (** Indicates Principal Problem)    Diagnosis Date Noted   • **Sepsis [A41.9] 04/29/2018   • Acute on chronic renal failure [N17.9, N18.9] 04/30/2018   • Bacteremia due to Klebsiella pneumoniae [R78.81] 04/30/2018   • UTI (urinary tract infection) [N39.0] 04/29/2018   • Long-term insulin use in type 2 diabetes [E11.9, Z79.4] 04/29/2018   • Morbid obesity [E66.01]       Resolved Hospital Problems    Diagnosis Date Noted Date Resolved   • Septic shock [A41.9, R65.21] 04/29/2018 05/01/2018     Wait for cultures on Zosyn and Levaquin.  Monitor urine output closely.  Transfer to the floor.    DVT prophylaxis:  SCDs/TEDs      Disposition:uncertain at this point          This document has been electronically signed by Glo Kirk MD on May 1, 2018 2:56 PM

## 2018-05-01 NOTE — PLAN OF CARE
Problem: Patient Care Overview  Goal: Plan of Care Review  Outcome: Ongoing (interventions implemented as appropriate)   04/30/18 2000 05/01/18 0619   Coping/Psychosocial   Plan of Care Reviewed With patient;family --    Plan of Care Review   Progress --  no change   OTHER   Outcome Summary --  vss remain stable. uop continues to be inadequate for shift.      Goal: Individualization and Mutuality  Outcome: Ongoing (interventions implemented as appropriate)    Goal: Discharge Needs Assessment  Outcome: Ongoing (interventions implemented as appropriate)    Goal: Interprofessional Rounds/Family Conf  Outcome: Ongoing (interventions implemented as appropriate)      Problem: Fall Risk (Adult)  Goal: Identify Related Risk Factors and Signs and Symptoms  Outcome: Ongoing (interventions implemented as appropriate)    Goal: Absence of Fall  Outcome: Ongoing (interventions implemented as appropriate)      Problem: Skin Injury Risk (Adult)  Goal: Identify Related Risk Factors and Signs and Symptoms  Outcome: Ongoing (interventions implemented as appropriate)    Goal: Skin Health and Integrity  Outcome: Ongoing (interventions implemented as appropriate)      Problem: Urinary Tract Infection (Adult)  Goal: Signs and Symptoms of Listed Potential Problems Will be Absent, Minimized or Managed (Urinary Tract Infection)  Outcome: Ongoing (interventions implemented as appropriate)      Problem: Pain, Chronic (Adult)  Goal: Identify Related Risk Factors and Signs and Symptoms  Outcome: Ongoing (interventions implemented as appropriate)    Goal: Acceptable Pain/Comfort Level and Functional Ability  Outcome: Ongoing (interventions implemented as appropriate)      Problem: Sepsis/Septic Shock (Adult)  Goal: Signs and Symptoms of Listed Potential Problems Will be Absent, Minimized or Managed (Sepsis/Septic Shock)  Outcome: Ongoing (interventions implemented as appropriate)

## 2018-05-01 NOTE — THERAPY TREATMENT NOTE
Inpatient Rehabilitation - Speech Language Pathology   Swallow Treatment Note [facility]     Patient Name: Tessy Gonzalez  : 1940  MRN: [id number]  Today's Date: 2018               Admit Date: 2018  Pt completed 118 cc of thin liquid via rim of cup and spoon with no overt s/s of aspiration. Pt complained of generalized pain and screamed out when positioning for PO. Pts SpO2 dropped to 75% during position but came up to 92% immediately following upright position in bed. A pillow was rolled for neck/head to achieve neutral head position for PO. No difficulties noted this date on thin liquids. Continue currently diet with positioning strategies, small bits/sips, slow rate, alternation between solids/liquids. Upgrade when appropraite.    Candelaria Zavaleta MS CCC-SLP    Visit Dx:      ICD-10-CM ICD-9-CM   1. Oropharyngeal dysphagia R13.12 787.22     Patient Active Problem List   Diagnosis   • Sepsis   • Septic shock   • UTI (urinary tract infection)   • Hypertension   • Long-term insulin use in type 2 diabetes   • CKD (chronic kidney disease) stage 3, GFR 30-59 ml/min   • Morbid obesity   • Acute on chronic renal failure   • Bacteremia due to Klebsiella pneumoniae       Therapy Treatment    Therapy Treatment / Health Promotion    Treatment Time/Intention  Discipline: speech language pathologist (18 0849 : Candelaria Zavaleta MS CCC-SLP)  Document Type: therapy note (daily note) (18 0849 : Candelaria Zavaleta MS CCC-SLP)  Subjective Information: pain (18 0849 : Candelaria Zavaleta MS CCC-SLP)  Mode of Treatment: individual therapy (18 0849 : Candelaria Zavaleta MS CCC-SLP)  Patient/Family Observations: Pt screamed out when positioned for PO. Pt verbalized generalized pain. O2 dropped to 75% during positioning upright in bed. O2 came back up immediately to 92%.  (18 0849 : Candelaria Zavaleta MS CCC-SLP)  Care Plan Review: care plan/treatment goals reviewed (18  0849 : Candelaria Zavaleta MS CCC-SLP)  Therapy Frequency (Swallow): other (see comments) (3-5 times per week ) (05/01/18 0849 : Candelaria Zavaleta MS CCC-SLP)  Patient Effort: fair (05/01/18 0849 : Candelaria Zavaleta MS CCC-SLP)  Patient Response to Treatment: No overt s/s of aspiration on thin liquids via spoon (05/01/18 0849 : Candelaria Zavaleta MS CCC-SLP)  Plan of Care Review  Plan of Care Reviewed With: patient (05/01/18 0933 : Candelaria Zavaleta MS CCC-SLP)    Vitals/Pain/Safety  Vital Signs  Pre SpO2 (%): 94 (05/01/18 0849 : Candelaria Zavaleta [facility]-SLP)  O2 Delivery Pre Treatment: nasal cannula (05/01/18 0849 : Candelaria Zavaleta MS CCC-SLP)  Intra SpO2 (%): 92 (05/01/18 0849 : Candelaria Zavaleta [facility]-SLP)  O2 Delivery Intra Treatment: supplemental O2 (05/01/18 0849 : Candelaria Zavaleta MS CCC-SLP)  Post SpO2 (%): 99 (05/01/18 0849 : Candelaria Zavaleta [facility]-SLP)  O2 Delivery Post Treatment: supplemental O2 (05/01/18 0849 : Candelaria Zavaleta MS CCC-SLP)  Intra Patient Position: Sitting (05/01/18 0849 : Candelaria Zavaleta MS CCC-SLP)  Post Patient Position: Sitting (05/01/18 0849 : Candelaria Zavaleta MS CCC-SLP)    Cognition, Communication, Swallow  Speaking Valve  Pre SpO2 (%): 94 (05/01/18 0849 : Candelaria Zavaleta MS CCC-SLP)  Post SpO2 (%): 99 (05/01/18 0849 : Candelaria Zavaleta MS CCC-SLP)  General Eating/Swallowing Observations  Respiratory Support Currently in Use: nasal cannula (05/01/18 0849 : Candelaria Zavaleta MS CCC-SLP)  Eating/Swallowing Skills: self-fed (05/01/18 0849 : Candelaria Zavaleta MS CCC-SLP)  Positioning During Eating: upright in bed (05/01/18 0849 : MS DESIRE Mittal)  Pre SpO2 (%): 94 (05/01/18 0849 : MS DESIRE Mittal)  Post SpO2 (%): 99 (05/01/18 0849 : MS DESIRE Mittal)  Clinical Swallow Eval  Oral Prep Phase: WFL (05/01/18 0849 : MS DESIRE Mittal)  Oral Transit: WFL (05/01/18 0849 : Candelaria Zavaleta MS  CCC-SLP)  Oral Residue: WFL (05/01/18 0849 : Candelaria Zavaleta, MS CCC-SLP)  Pharyngeal Phase: no overt signs/symptoms of pharyngeal impairment (05/01/18 0849 : Candelaria Zavaleta MS CCC-SLP)  Recommendations  Therapy Frequency (Swallow): other (see comments) (3-5 times per week ) (05/01/18 0849 : Candelaria Zavaleta MS CCC-SLP)  SLP Diet Recommendation: clear liquid diet (until MD gives clearance to advance) (05/01/18 0849 : Candelaria Zavaleta MS CCC-SLP)  Recommended Precautions and Strategies: no straw, upright posture during/after eating, small bites of food and sips of liquid (05/01/18 0849 : Candelaria Zavaleta MS CCC-SLP)  SLP Rec. for Method of Medication Administration: meds whole, with pudding or applesauce (05/01/18 0849 : Candelaria Zavaleta MS CCC-SLP)  Monitor for Signs of Aspiration: yes (05/01/18 0849 : Candelaria Zavaleta MS CCC-SLP)  Anticipated Dischage Disposition: home with 24/7 care (05/01/18 0849 : Candelaria Zavaleta MS CCC-SLP)    Outcome Summary  Outcome Summary/Treatment Plan (SLP)  Daily Summary of Progress (SLP): progress toward functional goals is gradual (05/01/18 0849 : Candelaria Zavaleta MS CCC-SLP)  Plan for Continued Treatment (SLP): diet toleration/upgrade when medically appropriate  (05/01/18 0849 : Candelaria Zavaleta MS CCC-SLP)  Anticipated Dischage Disposition: home with 24/7 care (05/01/18 0849 : Candelaria Zavaleta MS CCC-SLP)            SLP GOALS     Row Name 05/01/18 0849 04/30/18 1400          Oral Nutrition/Hydration Goal 1 (SLP)    Oral Nutrition/Hydration Goal 1, SLP Pt to tolerate least restrictive diet with no s/s of aspiration for adequate nutrition and hydration.   -EA Pt to tolerate least restrictive diet with no s/s of aspiration for adequate nutrition and hydration.   -EK     Time Frame (Oral Nutrition/Hydration Goal 1, SLP) by discharge  -EA by discharge  -EK     Progress/Outcomes (Oral Nutrition/Hydration Goal 1, SLP) goal not met;goal ongoing  -EA goal  not met  -EK       User Key  (r) = Recorded By, (t) = Taken By, (c) = Cosigned By    Initials Name Provider Type    TANVI Pedroza, CCC-SLP Speech and Language Pathologist    VINITA Zavaleta, MS CCC-SLP Speech and Language Pathologist          EDUCATION  The patient has been educated in the following areas:   Dysphagia (Swallowing Impairment).    SLP Recommendation and Plan     SLP Diet Recommendation: clear liquid diet (until MD gives clearance to advance)  Recommended Precautions and Strategies: no straw, upright posture during/after eating, small bites of food and sips of liquid     Monitor for Signs of Aspiration: yes        Anticipated Dischage Disposition: home with 24/7 care     Therapy Frequency (Swallow): other (see comments) (3-5 times per week )          Plan of Care Reviewed With: patient  Plan of Care Review  Plan of Care Reviewed With: patient  Daily Summary of Progress (SLP): progress toward functional goals is gradual  Plan for Continued Treatment (SLP): diet toleration/upgrade when medically appropriate   Progress: improving  Outcome Summary: Pt completed 118 cc of thin liquid via rim of cup and spoon with no overt s/s of aspiration. Pt complained of generalized pain and screamed out when positioning for PO. Pts SpO2 dropped to 75% during position but came up to 92% immediately following upright position in bed. A pillow was rolled for neck/head to achieve neutral head position for PO. No difficulties noted this date on thin liquids. Continue currently diet with positioning strategies, small bits/sips, slow rate, alternation between solids/liquids. Upgrade when appropraite.           Time Calculation:         Time Calculation- SLP     Row Name 05/01/18 0939             Time Calculation- SLP    SLP Start Time 0849  -EA      SLP Stop Time 0917  -EA      SLP Time Calculation (min) 28 min  -      Total Timed Code Minutes- SLP 28 minute(s)  -      SLP Received On 04/30/18  -VINITA       SLP Goal Re-Cert Due Date 05/13/18  -VINITA        User Key  (r) = Recorded By, (t) = Taken By, (c) = Cosigned By    Initials Name Provider Type    VINITA Zavaleta MS CCC-SLP Speech and Language Pathologist          Therapy Charges for Today     Code Description Service Date Service Provider Modifiers Qty    [id number] HC ST TREATMENT SWALLOW 2 5/1/2018 Candelaria Zavaleta MS CCC-SLP GN 1                 MS DESIRE Mittal  5/1/2018

## 2018-05-01 NOTE — PLAN OF CARE
Problem: Patient Care Overview  Goal: Plan of Care Review  Outcome: Ongoing (interventions implemented as appropriate)   05/01/18 0916   Coping/Psychosocial   Plan of Care Reviewed With patient   Plan of Care Review   Progress improving   OTHER   Outcome Summary Pt completed 118 cc of thin liquid via rim of cup and spoon with no overt s/s of aspiration. Pt complained of generalized pain and screamed out when positioning for PO. Pts SpO2 dropped to 75% during position but came up to 92% immediately following upright position in bed. A pillow was rolled for neck/head to achieve neutral head position for PO. No difficulties noted this date on thin liquids. Continue currently diet with positioning strategies, small bits/sips, slow rate, alternation between solids/liquids. Upgrade when appropraite.

## 2018-05-02 PROBLEM — A41.4 SEPSIS DUE TO KLEBSIELLA PNEUMONIAE (HCC): Status: ACTIVE | Noted: 2018-04-29

## 2018-05-02 LAB
ALBUMIN SERPL-MCNC: 2.3 G/DL (ref 3.4–4.8)
ALBUMIN/GLOB SERPL: 0.7 G/DL (ref 1.1–1.8)
ALP SERPL-CCNC: 79 U/L (ref 38–126)
ALT SERPL W P-5'-P-CCNC: 24 U/L (ref 9–52)
ANION GAP SERPL CALCULATED.3IONS-SCNC: 9 MMOL/L (ref 5–15)
AST SERPL-CCNC: 20 U/L (ref 14–36)
BACTERIA SPEC AEROBE CULT: ABNORMAL
BASOPHILS # BLD AUTO: 0.03 10*3/MM3 (ref 0–0.2)
BASOPHILS NFR BLD AUTO: 0.1 % (ref 0–2)
BILIRUB SERPL-MCNC: 0.4 MG/DL (ref 0.2–1.3)
BUN BLD-MCNC: 43 MG/DL (ref 7–21)
BUN/CREAT SERPL: 24.9 (ref 7–25)
CALCIUM SPEC-SCNC: 8.4 MG/DL (ref 8.4–10.2)
CHLORIDE SERPL-SCNC: 110 MMOL/L (ref 95–110)
CO2 SERPL-SCNC: 18 MMOL/L (ref 22–31)
CREAT BLD-MCNC: 1.73 MG/DL (ref 0.5–1)
DEPRECATED RDW RBC AUTO: 42.5 FL (ref 36.4–46.3)
EOSINOPHIL # BLD AUTO: 0.41 10*3/MM3 (ref 0–0.7)
EOSINOPHIL NFR BLD AUTO: 1.5 % (ref 0–7)
ERYTHROCYTE [DISTWIDTH] IN BLOOD BY AUTOMATED COUNT: 13.1 % (ref 11.5–14.5)
GFR SERPL CREATININE-BSD FRML MDRD: 29 ML/MIN/1.73 (ref 39–90)
GLOBULIN UR ELPH-MCNC: 3.3 GM/DL (ref 2.3–3.5)
GLUCOSE BLD-MCNC: 186 MG/DL (ref 60–100)
GLUCOSE BLDC GLUCOMTR-MCNC: 184 MG/DL (ref 70–130)
GLUCOSE BLDC GLUCOMTR-MCNC: 220 MG/DL (ref 70–130)
GLUCOSE BLDC GLUCOMTR-MCNC: 231 MG/DL (ref 70–130)
GLUCOSE BLDC GLUCOMTR-MCNC: 265 MG/DL (ref 70–130)
GRAM STN SPEC: ABNORMAL
HCT VFR BLD AUTO: 31.2 % (ref 35–45)
HGB BLD-MCNC: 10.4 G/DL (ref 12–15.5)
IMM GRANULOCYTES # BLD: 0.13 10*3/MM3 (ref 0–0.02)
IMM GRANULOCYTES NFR BLD: 0.5 % (ref 0–0.5)
ISOLATED FROM: ABNORMAL
LYMPHOCYTES # BLD AUTO: 2.36 10*3/MM3 (ref 0.6–4.2)
LYMPHOCYTES NFR BLD AUTO: 8.5 % (ref 10–50)
MCH RBC QN AUTO: 29.8 PG (ref 26.5–34)
MCHC RBC AUTO-ENTMCNC: 33.3 G/DL (ref 31.4–36)
MCV RBC AUTO: 89.4 FL (ref 80–98)
MONOCYTES # BLD AUTO: 1.17 10*3/MM3 (ref 0–0.9)
MONOCYTES NFR BLD AUTO: 4.2 % (ref 0–12)
NEUTROPHILS # BLD AUTO: 23.55 10*3/MM3 (ref 2–8.6)
NEUTROPHILS NFR BLD AUTO: 85.2 % (ref 37–80)
NRBC BLD MANUAL-RTO: 0 /100 WBC (ref 0–0)
PLATELET # BLD AUTO: 121 10*3/MM3 (ref 150–450)
PMV BLD AUTO: 11.8 FL (ref 8–12)
POTASSIUM BLD-SCNC: 3.8 MMOL/L (ref 3.5–5.1)
PROT SERPL-MCNC: 5.6 G/DL (ref 6.3–8.6)
RBC # BLD AUTO: 3.49 10*6/MM3 (ref 3.77–5.16)
SODIUM BLD-SCNC: 137 MMOL/L (ref 137–145)
WBC NRBC COR # BLD: 27.65 10*3/MM3 (ref 3.2–9.8)

## 2018-05-02 PROCEDURE — G8982 BODY POS GOAL STATUS: HCPCS

## 2018-05-02 PROCEDURE — G8983 BODY POS D/C STATUS: HCPCS

## 2018-05-02 PROCEDURE — 25010000002 LEVOFLOXACIN PER 250 MG: Performed by: FAMILY MEDICINE

## 2018-05-02 PROCEDURE — 80053 COMPREHEN METABOLIC PANEL: CPT | Performed by: FAMILY MEDICINE

## 2018-05-02 PROCEDURE — 97162 PT EVAL MOD COMPLEX 30 MIN: CPT

## 2018-05-02 PROCEDURE — G8998 SWALLOW D/C STATUS: HCPCS | Performed by: SPEECH-LANGUAGE PATHOLOGIST

## 2018-05-02 PROCEDURE — 25010000002 PIPERACILLIN SOD-TAZOBACTAM PER 1 G: Performed by: INTERNAL MEDICINE

## 2018-05-02 PROCEDURE — 99232 SBSQ HOSP IP/OBS MODERATE 35: CPT | Performed by: FAMILY MEDICINE

## 2018-05-02 PROCEDURE — 63710000001 INSULIN DETEMIR PER 5 UNITS: Performed by: FAMILY MEDICINE

## 2018-05-02 PROCEDURE — 92526 ORAL FUNCTION THERAPY: CPT | Performed by: SPEECH-LANGUAGE PATHOLOGIST

## 2018-05-02 PROCEDURE — G8981 BODY POS CURRENT STATUS: HCPCS

## 2018-05-02 PROCEDURE — 94799 UNLISTED PULMONARY SVC/PX: CPT

## 2018-05-02 PROCEDURE — G8988 SELF CARE GOAL STATUS: HCPCS

## 2018-05-02 PROCEDURE — 94760 N-INVAS EAR/PLS OXIMETRY 1: CPT

## 2018-05-02 PROCEDURE — 63710000001 INSULIN ASPART PER 5 UNITS: Performed by: INTERNAL MEDICINE

## 2018-05-02 PROCEDURE — 85025 COMPLETE CBC W/AUTO DIFF WBC: CPT | Performed by: FAMILY MEDICINE

## 2018-05-02 PROCEDURE — 82962 GLUCOSE BLOOD TEST: CPT

## 2018-05-02 PROCEDURE — 97167 OT EVAL HIGH COMPLEX 60 MIN: CPT

## 2018-05-02 PROCEDURE — 25010000002 MORPHINE PER 10 MG: Performed by: INTERNAL MEDICINE

## 2018-05-02 PROCEDURE — G8997 SWALLOW GOAL STATUS: HCPCS | Performed by: SPEECH-LANGUAGE PATHOLOGIST

## 2018-05-02 PROCEDURE — G8987 SELF CARE CURRENT STATUS: HCPCS

## 2018-05-02 RX ADMIN — HYDROCORTISONE: 1 CREAM TOPICAL at 20:28

## 2018-05-02 RX ADMIN — LEVOFLOXACIN 250 MG: 5 INJECTION, SOLUTION INTRAVENOUS at 16:10

## 2018-05-02 RX ADMIN — SODIUM BICARBONATE 50 MEQ: 84 INJECTION INTRAVENOUS at 09:12

## 2018-05-02 RX ADMIN — INSULIN ASPART 5 UNITS: 100 INJECTION, SOLUTION INTRAVENOUS; SUBCUTANEOUS at 11:18

## 2018-05-02 RX ADMIN — MORPHINE SULFATE 1 MG: 4 INJECTION, SOLUTION INTRAMUSCULAR; INTRAVENOUS at 14:31

## 2018-05-02 RX ADMIN — TAZOBACTAM SODIUM AND PIPERACILLIN SODIUM 2.25 G: 250; 2 INJECTION, SOLUTION INTRAVENOUS at 06:19

## 2018-05-02 RX ADMIN — NYSTATIN: 100000 POWDER TOPICAL at 20:28

## 2018-05-02 RX ADMIN — NYSTATIN: 100000 POWDER TOPICAL at 09:06

## 2018-05-02 RX ADMIN — INSULIN ASPART 8 UNITS: 100 INJECTION, SOLUTION INTRAVENOUS; SUBCUTANEOUS at 17:18

## 2018-05-02 RX ADMIN — INSULIN DETEMIR 10 UNITS: 100 INJECTION, SOLUTION SUBCUTANEOUS at 20:29

## 2018-05-02 RX ADMIN — INSULIN ASPART 3 UNITS: 100 INJECTION, SOLUTION INTRAVENOUS; SUBCUTANEOUS at 09:05

## 2018-05-02 RX ADMIN — POTASSIUM CHLORIDE, DEXTROSE MONOHYDRATE AND SODIUM CHLORIDE 100 ML/HR: 150; 5; 450 INJECTION, SOLUTION INTRAVENOUS at 16:10

## 2018-05-02 RX ADMIN — POTASSIUM CHLORIDE, DEXTROSE MONOHYDRATE AND SODIUM CHLORIDE 150 ML/HR: 150; 5; 450 INJECTION, SOLUTION INTRAVENOUS at 04:39

## 2018-05-02 RX ADMIN — HYDROCORTISONE: 1 CREAM TOPICAL at 09:06

## 2018-05-02 RX ADMIN — INSULIN ASPART 5 UNITS: 100 INJECTION, SOLUTION INTRAVENOUS; SUBCUTANEOUS at 20:28

## 2018-05-02 NOTE — THERAPY EVALUATION
Acute Care - Occupational Therapy Initial Evaluation  [facility]     Patient Name: Tessy Gonzalez  : 1940  MRN: [id number]  Today's Date: 2018  Onset of Illness/Injury or Date of Surgery: 18  Date of Referral to OT: 18  Referring Physician: Dr. Glo Kirk    Admit Date: 2018       ICD-10-CM ICD-9-CM   1. Oropharyngeal dysphagia R13.12 787.22   2. Impaired mobility and ADLs Z74.09 799.89     Patient Active Problem List   Diagnosis   • Sepsis due to Klebsiella pneumoniae   • UTI (urinary tract infection)   • Hypertension   • Long-term insulin use in type 2 diabetes   • CKD (chronic kidney disease) stage 3, GFR 30-59 ml/min   • Morbid obesity   • Acute on chronic renal failure   • Bacteremia due to Klebsiella pneumoniae     Past Medical History:   Diagnosis Date   • Atherosclerosis    • Basal cell carcinoma    • Basal cell carcinoma of medial canthus, left    • Basal cell carcinoma of nose 2000    right lateral    • Bilateral leg edema    • Cellulitis    • CKD (chronic kidney disease) stage 3, GFR 30-59 ml/min    • Degenerative joint disease involving multiple joints    • Dementia    • Depression    • Diabetes mellitus    • Diabetic nephropathy    • Diabetic neuropathy    • Diastolic dysfunction    • Foot abscess    • GERD (gastroesophageal reflux disease)    • Hypertension    • Ischemic stroke    • Morbid obesity    • Peripheral venous insufficiency    • Recurrent UTI (urinary tract infection)      Past Surgical History:   Procedure Laterality Date   • BIOPSY SKIN / SQ / MUCOUS MEMBRANE     • HIP ARTHROPLASTY Right    • SKIN GRAFT      taken from the neck for the face   • VEIN LIGATION AND STRIPPING BILATERAL            OT ASSESSMENT FLOWSHEET (last 72 hours)      Occupational Therapy Evaluation     Row Name 18 0823                   OT Evaluation Time/Intention    Subjective Information no complaints  -        Document Type evaluation  -        Mode of Treatment  co-treatment  -        Total Evaluation Minutes, Occupational Therapy 29  -        Patient Effort fair  -        Symptoms Noted During/After Treatment none  -        Comment Pt declined to engage in LB ROM or bed mobility, agreed to UB ROM, strength and hand washing.   -           General Information    Patient Profile Reviewed? yes  -        Onset of Illness/Injury or Date of Surgery 04/29/18  -        Referring Physician Dr. Glo Kirk  -        Patient Observations lethargic  -        Patient/Family Observations Family/caregiver present.  -        General Observations of Patient Pt supine in bed, lethargic but awoke and engage, on o2, catheter  -        Prior Level of Function independent:;feeding;mod assist:;grooming;max assist:;dressing;dependent:;bathing;bed mobility   pt bed bound, no ambulation, assisted in rolling  -        Equipment Currently Used at Home hospital bed  -        Pertinent History of Current Functional Problem To ED with weakness and lethargy, UTI, sepsis  -        Existing Precautions/Restrictions fall;oxygen therapy device and L/min  -        Limitations/Impairments safety/cognitive  -        Risks Reviewed patient and family:  -        Benefits Reviewed patient and family:  -        Barriers to Rehab medically complex;previous functional deficit;cognitive status  -           Relationship/Environment    Lives With child(tonya), adult  -           Resource/Environmental Concerns    Current Living Arrangements home/apartment/condo  -           Bed Mobility Assessment/Treatment    Comment (Bed Mobility) pt declined to engage in rolling in bed.   -           ADL Assessment/Intervention    BADL Assessment/Intervention grooming  -           Grooming Assessment/Training    Comment (Grooming) pt was supine and getting something out of her nose then blew her nose when OT handed a napkin with set up assist, mod assist to clean off her finger. pt was  handed a washcloth with soap to wash off her hands pt did wash with mod difficulty and did not fully clean her hands. Pt reported the wash cloth was heavy family stated she usually does a better job.   -           BADL Safety/Performance    Impairments, BADL Safety/Performance cognition;endurance/activity tolerance;grasp/prehension;coordination;motor control;motor planning;pain;range of motion;shortness of breath;strength;trunk/postural control  -           General ROM    GENERAL ROM COMMENTS Pt RUE WFL not full shoulder range and difficulty with elbow and other joints, LUE limited shoulder range in supine grossly approx 40 degrees, rest appeared WFL but not full range.   -           General Assessment (Manual Muscle Testing)    General Manual Muscle Testing (MMT) Assessment upper extremity strength deficits identified  -           Upper Extremity (Manual Muscle Testing)    Comment, MMT: Upper Extremity Pt RUE  grossly 3+ to 4-/5; RUE grossly 4-/5; LUE  grossly 3+/5; LUE grossly 3 to 3+/5 in limited range.   -           Sensory Assessment/Intervention    Additional Documentation Hearing Assessment (Group)  -           Light Touch Sensation Assessment    Left Upper Extremity: Light Touch Sensation Assessment other (see comments)   apeared WFL pt decreased direction following/not fully asses  -        Right Upper Extremity: Light Touch Sensation Assessment other (see comments)   apeared L pt decreased direction following/not fully asses  -           Hearing Assessment    Hearing Status hearing impairment, bilaterally  -           Positioning and Restraints    Pre-Treatment Position in bed  -        Post Treatment Position bed  -        In Bed supine;fowlers;call light within reach;encouraged to call for assist;patient within staff view  -           Pain Assessment    Additional Documentation Pain Scale: Numbers Pre/Post-Treatment (Group)  -           Pain Scale: Numbers  Pre/Post-Treatment    Pain Scale: Numbers, Pretreatment --   pt reported no pain  -        Pain Scale: Numbers, Post-Treatment --   pt reported no pain  -           Wound 05/01/18 1700 Right      Wound - Properties Group Date first assessed: 05/01/18  -MM Time first assessed: 1700  -MM Present On Admission : no  -MM Side: Right  -MM       Wound 05/01/18 1700 Bilateral      Wound - Properties Group Date first assessed: 05/01/18  -MM Time first assessed: 1700  -MM Present On Admission : no  -MM Side: Bilateral  -MM       Clinical Impression (OT)    Date of Referral to OT 05/01/18  -        OT Diagnosis Impaired mobility and ADL  -        Prognosis (OT Eval) fair/guarded  -        Functional Level at Time of Evaluation (OT Eval) Pt declined to engage in any bed mobiltiy, rolling or repositiong, pt declined to engage in movement of her LLE. Pt engaged in ROM with BUE with limitations noted. Pt was weaker in her LUE verse RUE.   -        Patient/Family Goals Statement (OT Eval) to go home,   -        Criteria for Skilled Therapeutic Interventions Met (OT Eval) yes;treatment indicated  -        Rehab Potential (OT Eval) fair, will monitor progress closely  -        Therapy Frequency (OT Eval) other (see comments)   3-14x a week  -        Predicted Duration of Therapy Intervention (OT Eval) until d/c  -        Care Plan Review (OT) evaluation/treatment results reviewed;care plan/treatment goals reviewed;risks/benefits reviewed;patient/other agree to care plan  -        Care Plan Review, Other Participant (OT Eval) family  -        Anticipated Discharge Disposition (OT) --   home with 24/7  -           Vital Signs    Pre Systolic BP Rehab 148  -BH        Pre Treatment Diastolic BP 69  -        Pretreatment Heart Rate (beats/min) 78  -        Posttreatment Heart Rate (beats/min) 77  -        Pre SpO2 (%) 95  -        O2 Delivery Pre Treatment supplemental O2  -        Post SpO2 (%) 97   -        O2 Delivery Post Treatment supplemental O2  -        Pre Patient Position Supine  -BH        Post Patient Position Supine  -BH           Planned OT Interventions    Planned Therapy Interventions (OT Eval) activity tolerance training;BADL retraining;cognitive/visual perception retraining;occupation/activity based interventions;passive ROM/stretching;patient/caregiver education/training;ROM/therapeutic exercise;strengthening exercise  -           OT Goals    Grooming Goal Selection (OT) grooming, OT goal 1  -BH        Self-Feeding Goal Selection (OT) self feeding, OT goal 1  -        Endurance Goal Selection (OT) endurance, OT goal 1  -        Additional Documentation Endurance Goal Selection (OT) (Row);Grooming Goal Selection (OT) (Row);Self-Feeding Goal Selection (OT) (Row)  -           Grooming Goal 1 (OT)    Activity/Device (Grooming Goal 1, OT) oral care;wash face, hands  -        [place] (Grooming Goal 1, OT) set-up required;tactile cues required;verbal cues required;minimum assist (75% or more patient effort)  -        Time Frame (Grooming Goal 1, OT) long term goal (LTG);by discharge  -        Progress/Outcome (Grooming Goal 1, OT) goal not met  -           Self-Feeding Goal 1 (OT)    Activity/Assistive Device (Self-Feeding Goal 1, OT) self-feeding skills, all  -        [place] Level/Cues Needed (Self-Feeding Goal 1, OT) set-up required;supervision required;tactile cues required;verbal cues required  -        Time Frame (Self-Feeding Goal 1, OT) long term goal (LTG);by discharge  -        Progress/Outcomes (Self-Feeding Goal 1, OT) goal not met  -            Endurance Goal 1 (OT)    Endurance Goal 1 (OT) Pt will tolerate and engage in BUE ROM within toleration for range of 10 reps 2 sets with min rest breaks.   -        Activity Level (Endurance Goal 1, OT) endurance 2 fair  -        Time Frame (Endurance Goal 1, OT) long term goal (LTG);by discharge  -         Progress/Outcome (Endurance Goal 1, OT) goal not met  -           Living Environment    Home Accessibility --   patient is bed bound  -          User Key  (r) = Recorded By, (t) = Taken By, (c) = Cosigned By    Initials Name Effective Dates     YENIFER Edwards/L 10/17/16 -     MM Celeste Plata RN 10/17/16 -            Occupational Therapy Education     Title: PT OT SLP Therapies (Done)     Topic: Occupational Therapy (Done)     Point: ADL training (Done)     Description: Instruct learner(s) on proper safety adaptation and remediation techniques during self care or transfers.   Instruct in proper use of assistive devices.   Learning Progress Summary     Learner Status Readiness Method Response Comment Documented by    Patient Done Acceptance E MARII,NR Educated about OT and POC. Educated to call for assist.  05/02/18 1049    Family Done Acceptance E MARII,NR Educated about OT and POC. Educated to call for assist.  05/02/18 1049                      User Key     Initials Effective Dates Name Provider Type Discipline     10/17/16 -  YENIFER Edwards/L Occupational Therapist OT                  OT Recommendation and Plan  Outcome Summary/Treatment Plan (OT)  Anticipated Discharge Disposition (OT):  (home with 24/7)  Planned Therapy Interventions (OT Eval): activity tolerance training, BADL retraining, cognitive/visual perception retraining, occupation/activity based interventions, passive ROM/stretching, patient/caregiver education/training, ROM/therapeutic exercise, strengthening exercise  Therapy Frequency (OT Eval): other (see comments) (3-14x a week)  Plan of Care Review  Plan of Care Reviewed With: family, patient  Plan of Care Reviewed With: family, patient  Outcome Summary: OT eval completed this date with family present. Pt was lethargic but awoke for activities. Pt engaged inBUE ROM and strength testing with her LUE weaker then her RUE. Pt struggled with hand washing and fatigued with the  task and did not fully complete. Pt declined to engage in any ROM or movement with BLE and did not want to engage in any bed mobility. Pt could benefit from skilled OT to increase BUE strength and endurance and independence with ADL especially self feeding and grooming as pt was able to use her BUE to assist with bed mobility and feed herself prior to getting sick. Recommend d/c home with 24/7 and possibly more therapy to get stronger.  Pt may benefit from a SNF.           Outcome Measures     Row Name 05/02/18 0823 05/02/18 0822          How much help from another person do you currently need...    Turning from your back to your side while in flat bed without using bedrails?  -- 2  -CZ     Moving from lying on back to sitting on the side of a flat bed without bedrails?  -- 2  -CZ     Moving to and from a bed to a chair (including a wheelchair)?  -- 1  -CZ     Standing up from a chair using your arms (e.g., wheelchair, bedside chair)?  -- 1  -CZ     Climbing 3-5 steps with a railing?  -- 1  -CZ     To walk in hospital room?  -- 1  -CZ     AM-PAC 6 Clicks Score  -- 8  -CZ        How much help from another is currently needed...    Putting on and taking off regular lower body clothing? 1  -BH  --     Bathing (including washing, rinsing, and drying) 1  -BH  --     Toileting (which includes using toilet bed pan or urinal) 1  -BH  --     Putting on and taking off regular upper body clothing 1  -BH  --     Taking care of personal grooming (such as brushing teeth) 2  -BH  --     Eating meals 2  -BH  --     Score 8  -BH  --        Functional Assessment    Outcome Measure Options AM-PAC 6 Clicks Daily Activity (OT)  - AM-PAC 6 Clicks Basic Mobility (PT)  -       User Key  (r) = Recorded By, (t) = Taken By, (c) = Cosigned By    Initials Name Provider Type     Rea Robbins, OTR/L Occupational Therapist    MARGI Rivers, PT Physical Therapist          Time Calculation:   OT Start Time: 0822  OT Stop Time: 0851  OT  Time Calculation (min): 29 min    Therapy Charges for Today     Code Description Service Date Service Provider Modifiers Qty    02022630247  OT SELFCARE CURRENT 5/2/2018 Rea Robbins OTR/L GO, CM 1    [id number]  OT SELFCARE PROJECTED 5/2/2018 Rea Robbins OTR/L GO, CL 1    [id number]  OT EVAL HIGH COMPLEXITY 2 5/2/2018 Rea Robbins OTR/L GO 1          OT G-codes  OT Professional Judgement Used?: Yes  OT Functional Scales Options: AM-PAC 6 Clicks Daily Activity (OT)  Score: 8  Functional Limitation: Self care  Self Care Current Status (): At least 80 percent but less than 100 percent impaired, limited or restricted  Self Care Goal Status (): At least 60 percent but less than 80 percent impaired, limited or restricted    YENIFER Edwards/FATUMA  5/2/2018

## 2018-05-02 NOTE — PLAN OF CARE
Problem: Patient Care Overview  Goal: Plan of Care Review  Outcome: Ongoing (interventions implemented as appropriate)   05/02/18 0349   Coping/Psychosocial   Plan of Care Reviewed With patient   Plan of Care Review   Progress no change   OTHER   Outcome Summary pt tolerating clear liquids, alert to person, VSS     Goal: Individualization and Mutuality  Outcome: Ongoing (interventions implemented as appropriate)    Goal: Discharge Needs Assessment  Outcome: Ongoing (interventions implemented as appropriate)      Problem: Fall Risk (Adult)  Goal: Identify Related Risk Factors and Signs and Symptoms  Outcome: Outcome(s) achieved Date Met: 05/02/18    Goal: Absence of Fall  Outcome: Ongoing (interventions implemented as appropriate)      Problem: Skin Injury Risk (Adult)  Goal: Identify Related Risk Factors and Signs and Symptoms  Outcome: Outcome(s) achieved Date Met: 05/02/18    Goal: Skin Health and Integrity  Outcome: Ongoing (interventions implemented as appropriate)      Problem: Urinary Tract Infection (Adult)  Goal: Signs and Symptoms of Listed Potential Problems Will be Absent, Minimized or Managed (Urinary Tract Infection)  Outcome: Ongoing (interventions implemented as appropriate)      Problem: Pain, Chronic (Adult)  Goal: Identify Related Risk Factors and Signs and Symptoms  Outcome: Outcome(s) achieved Date Met: 05/02/18    Goal: Acceptable Pain/Comfort Level and Functional Ability  Outcome: Ongoing (interventions implemented as appropriate)

## 2018-05-02 NOTE — THERAPY DISCHARGE NOTE
Acute Care - Speech Language Pathology   Swallow Treatment Note/Discharge   [facility]     Patient Name: Tessy Gonzalez  : 1940  MRN: [id number]  Today's Date: 2018  Onset of Illness/Injury or Date of Surgery: 18     Referring Physician: Dr. Glo Kirk      Admit Date: 2018  Pt has no s/s of aspiration with chewable solids or thin liquids via straw this date.  Recommend upgrade to [facility] soft solids as pt does not have teeth.  D/c skilled st as there are no s/s of aspiration .    DESIRE Askew 2018 1:52 PM    Visit Dx:      ICD-10-CM ICD-9-CM   1. Oropharyngeal dysphagia R13.12 787.22   2. Impaired mobility and ADLs Z74.09 799.89   3. Impaired physical mobility Z74.09 781.99     Patient Active Problem List   Diagnosis   • Sepsis due to Klebsiella pneumoniae   • UTI (urinary tract infection)   • Hypertension   • Long-term insulin use in type 2 diabetes   • CKD (chronic kidney disease) stage 3, GFR 30-59 ml/min   • Morbid obesity   • Acute on chronic renal failure   • Bacteremia due to Klebsiella pneumoniae       Therapy Treatment    Therapy Treatment / Health Promotion    Treatment Time/Intention  Discipline: speech language pathologist (18 1330 : DESIRE Scott)  Document Type: discharge treatment (18 1330 : DESIRE Scott)  Subjective Information: no complaints (18 1330 : DESIRE Scott)  Mode of Treatment: speech-language pathology (18 1330 : DESIRE Scott)  Patient/Family Observations: daughter at bedside (18 1330 : DESIRE Scott)  Care Plan Review: care plan/treatment goals reviewed (18 1330 : DESIRE Scott)  Care Plan Review, Other Participant(s): daughter (18 1330 : Candelaria A [place], CCC-SLP)    Vitals/Pain/Safety  Pain Assessment  Additional Documentation: Pain Scale: Numbers Pre/Post-Treatment (Group) (18 1330 : Candelaria Doty,  CCC-SLP)  Pain Scale: Numbers Pre/Post-Treatment  Pain Scale: Numbers, Pretreatment: 0/10 - no pain (05/02/18 1330 : DESIRE Scott)  Pain Scale: Numbers, Post-Treatment: 0/10 - no pain (05/02/18 1330 : DESIRE Scott)  Positioning and Restraints  Pre-Treatment Position: in bed (05/02/18 1330 : DESIRE Scott)  Post Treatment Position: bed (05/02/18 1330 : DESIRE Scott)  In Bed: sitting (05/02/18 1330 : DESIRE Scott)    Cognition, Communication, Swallow  Recommendations  Anticipated Dischage Disposition: home with assist (05/02/18 1347 : DESIRE Scott)    Outcome Summary  Outcome Summary/Treatment Plan (SLP)  Anticipated Dischage Disposition: home with assist (05/02/18 1347 : DESIRE Scott)  Reason for Discharge: all goals and outcomes met, no further needs identified (05/02/18 1347 : DESIRE Scott)        SLP GOALS     Row Name 05/02/18 1330 05/01/18 0849 04/30/18 1400       Oral Nutrition/Hydration Goal 1 (SLP)    Oral Nutrition/Hydration Goal 1, SLP Pt to tolerate least restrictive diet with no s/s of aspiration for adequate nutrition and hydration.   -EC Pt to tolerate least restrictive diet with no s/s of aspiration for adequate nutrition and hydration.   -EA Pt to tolerate least restrictive diet with no s/s of aspiration for adequate nutrition and hydration.   -EK    Time Frame (Oral Nutrition/Hydration Goal 1, SLP) by discharge  -EC by discharge  -EA by discharge  -EK    Barriers (Oral Nutrition/Hydration Goal 1, SLP) none  -EC  --  --    Progress/Outcomes (Oral Nutrition/Hydration Goal 1, SLP) goal met  -EC goal not met;goal ongoing  -EA goal not met  -EK      User Key  (r) = Recorded By, (t) = Taken By, (c) = Cosigned By    Initials Name Provider Type    EC QUINN ScottSLP Speech and Language Pathologist    EK Candelaria Danielito Kasia, CCC-SLP Speech and Language Pathologist    VINITA  Candelaria Zavaleta, MS CCC-SLP Speech and Language Pathologist          EDUCATION  The patient has been educated in the following areas:   Dysphagia (Swallowing Impairment).    SLP Recommendation and Plan                    Anticipated Dischage Disposition: home with assist                            Time Calculation:         Time Calculation- SLP     Row Name 05/02/18 1348             Time Calculation- SLP    SLP Start Time 1330  -EC      SLP Stop Time 1400  -EC      SLP Time Calculation (min) 30 min  -EC      Total Timed Code Minutes- SLP 30 minute(s)  -EC      SLP Received On 05/02/18  -EC        User Key  (r) = Recorded By, (t) = Taken By, (c) = Cosigned By    Initials Name Provider Type    EC DESIRE Scott Speech and Language Pathologist          Therapy Charges for Today     Code Description Service Date Service Provider Modifiers Qty    76159517541 HC ST SWALLOWING PROJECTED 5/2/2018 DESIRE Scott GN, CI 1    40298272715 HC ST SWALLOWING DISCHARGE 5/2/2018 DESIRE Scott GN, CI 1    [id number] HC ST TREATMENT SWALLOW 2 5/2/2018 DESIRE Scott GN 1          SLP G-Codes  Functional Limitations: Swallowing  Swallow Goal Status (): At least 1 percent but less than 20 percent impaired, limited or restricted  Swallow Discharge Status (): At least 1 percent but less than 20 percent impaired, limited or restricted    SLP Discharge Summary  Anticipated Dischage Disposition: home with assist  Reason for Discharge: all goals and outcomes met, no further needs identified  Discharge Destination: other (see comments) (stilll on acute care)    DESIRE Askew  5/2/2018

## 2018-05-02 NOTE — THERAPY DISCHARGE NOTE
Acute Care - Physical Therapy Initial Eval/Discharge  [facility]     Patient Name: Tessy Gonzalez  : 1940  MRN: [id number]  Today's Date: 2018   Onset of Illness/Injury or Date of Surgery: 18  Date of Referral to PT: 18  Referring Physician: Dr. Glo Kirk      Admit Date: 2018    Visit Dx:    ICD-10-CM ICD-9-CM   1. Oropharyngeal dysphagia R13.12 787.22   2. Impaired mobility and ADLs Z74.09 799.89   3. Impaired physical mobility Z74.09 781.99     Patient Active Problem List   Diagnosis   • Sepsis due to Klebsiella pneumoniae   • UTI (urinary tract infection)   • Hypertension   • Long-term insulin use in type 2 diabetes   • CKD (chronic kidney disease) stage 3, GFR 30-59 ml/min   • Morbid obesity   • Acute on chronic renal failure   • Bacteremia due to Klebsiella pneumoniae     Past Medical History:   Diagnosis Date   • Atherosclerosis    • Basal cell carcinoma    • Basal cell carcinoma of medial canthus, left    • Basal cell carcinoma of nose 2000    right lateral    • Bilateral leg edema    • Cellulitis    • CKD (chronic kidney disease) stage 3, GFR 30-59 ml/min    • Degenerative joint disease involving multiple joints    • Dementia    • Depression    • Diabetes mellitus    • Diabetic nephropathy    • Diabetic neuropathy    • Diastolic dysfunction    • Foot abscess    • GERD (gastroesophageal reflux disease)    • Hypertension    • Ischemic stroke    • Morbid obesity    • Peripheral venous insufficiency    • Recurrent UTI (urinary tract infection)      Past Surgical History:   Procedure Laterality Date   • BIOPSY SKIN / SQ / MUCOUS MEMBRANE     • HIP ARTHROPLASTY Right    • SKIN GRAFT      taken from the neck for the face   • VEIN LIGATION AND STRIPPING BILATERAL            PT ASSESSMENT (last 12 hours)      Physical Therapy Evaluation     Row Name 18 0822          PT Evaluation Time/Intention    Subjective Information no complaints  -CZ     Document Type evaluation  -CZ      Mode of Treatment co-treatment;physical therapy;occupational therapy  -CZ     Total Evaluation Minutes, Physical Therapy 29  -CZ     Patient Effort poor  -CZ     Symptoms Noted During/After Treatment none  -CZ     Comment Patient unable/unwilling to move LEs secondary to pain, is resistent to bed mobility assessement as well.   -CZ     Row Name 05/02/18 0822          General Information    Patient Profile Reviewed? yes  -CZ     Onset of Illness/Injury or Date of Surgery 04/29/18  -CZ     Referring Physician SIENA Kirk MD.  -CZ     Patient Observations lethargic  -CZ     Patient/Family Observations Family/caregivers present.   -CZ     General Observations of Patient Supine in bed, lethargic, O2, catheter,   -CZ     Prior Level of Function dependent:;transfer;bed mobility   Bed bound, no ambulation.   -CZ     Equipment Currently Used at Home hospital bed  -CZ     Pertinent History of Current Functional Problem To ED with weakness and lethargy: UTI, Sepsis.   -CZ     Existing Precautions/Restrictions fall;oxygen therapy device and L/min  -CZ     Limitations/Impairments safety/cognitive  -CZ     Benefits Reviewed patient and family:;improve function;increase independence;increase strength;increase balance  -CZ     Barriers to Rehab medically complex;previous functional deficit;cognitive status  -CZ     Row Name 05/02/18 0822          Relationship/Environment    Lives With child(tonya), adult  -CZ     Row Name 05/02/18 0822          Resource/Environmental Concerns    Current Living Arrangements home/apartment/condo  -     Row Name 05/02/18 0822          Cognitive Assessment/Intervention- PT/OT    Orientation Status (Cognition) oriented to;person;place  -CZ     Row Name 05/02/18 0822          Bed Mobility Assessment/Treatment    Comment (Bed Mobility) Patient unable/unwilling to participate.  -CZ     Row Name 05/02/18 0822          Transfer Assessment/Treatment    Comment (Transfers) Patient unable/unwilling to  participate.  -CZ     Row Name 05/02/18 0822          Gait/Stairs Assessment/Training    Comment (Gait/Stairs) Non-ambulatory x multiple years.   -CZ     Row Name 05/02/18 0822          General ROM    GENERAL ROM COMMENTS Unable to assess.  -CZ     Row Name 05/02/18 0822          General Assessment (Manual Muscle Testing)    Comment, General Manual Muscle Testing (MMT) Assessment Unable to assess.   -CZ     Row Name 05/02/18 0822          Hearing Assessment    Hearing Status hearing aid, bilateral  -CZ     Row Name             Wound 05/01/18 1700 Right      Wound - Properties Group Date first assessed: 05/01/18  -MM Time first assessed: 1700  -MM Present On Admission : no  -MM Side: Right  -MM    Row Name             Wound 05/01/18 1700 Bilateral      Wound - Properties Group Date first assessed: 05/01/18  -MM Time first assessed: 1700  -MM Present On Admission : no  -MM Side: Bilateral  -MM    Row Name 05/02/18 0822          Physical Therapy Clinical Impression    Date of Referral to PT 05/01/18  -CZ     PT Diagnosis (PT Clinical Impression) impaired physical mobility.   -CZ     Prognosis (PT Clinical Impression) poor  -CZ     Criteria for Skilled Interventions Met (PT Clinical Impression) yes;treatment indicated  -CZ     Pathology/Pathophysiology Noted (Describe Specifically for Each System) musculoskeletal;pulmonary  -CZ     Impairments Found (describe specific impairments) aerobic capacity/endurance;gait, locomotion, and balance;ventilation and respiration/gas exchange;joint integrity and mobility;motor function;muscle performance  -CZ     Rehab Potential (PT Clinical Summary) other (see comments)   decreased cognition and motivation  -CZ     Row Name 05/02/18 0822          Vital Signs    Pre Systolic BP Rehab 148  -CZ     Pre Treatment Diastolic BP 69  -CZ     Pretreatment Heart Rate (beats/min) 78  -CZ     Posttreatment Heart Rate (beats/min) 77  -CZ     Pre SpO2 (%) 95  -CZ     O2 Delivery Pre Treatment  supplemental O2  -CZ     Post SpO2 (%) 97  -CZ     O2 Delivery Post Treatment supplemental O2  -CZ     Pre Patient Position Supine  -CZ     Post Patient Position Supine  -CZ     Row Name 05/02/18 0822          Positioning and Restraints    Pre-Treatment Position in bed  -CZ     Post Treatment Position bed  -CZ     In Bed supine;call light within reach;exit alarm on;with family/caregiver  -CZ     Row Name 05/02/18 0822          Living Environment    Home Accessibility other (see comments)   patient is bed bound.  -CZ       User Key  (r) = Recorded By, (t) = Taken By, (c) = Cosigned By    Initials Name Provider Type    IZAIAH Plata, RN Registered Nurse    MARGI Rivers, PT Physical Therapist              PT Recommendation and Plan  Anticipated Discharge Disposition (PT): still a patient  Therapy Frequency (PT Clinical Impression): evaluation only  Outcome Summary/Treatment Plan (PT)  Anticipated Discharge Disposition (PT): still a patient  Plan of Care Reviewed With: patient, caregiver, daughter  Outcome Summary: Initial PT evaluation performed. Patient is lethargic, limited by c/o arthritic pain in LE joints, decreased cognition and previous functional deficits.  Patient is unable/unwilling to participate in assessment of LE ROM/ strength, is resistant to performing bed mobility task.  No further PT indicated at this time.  Please provide new PT orders if situation should change/improve.  Physician notified. Skilled PT discharged.           Outcome Measures     Row Name 05/02/18 0823 05/02/18 0822          How much help from another person do you currently need...    Turning from your back to your side while in flat bed without using bedrails?  -- 2  -CZ     Moving from lying on back to sitting on the side of a flat bed without bedrails?  -- 2  -CZ     Moving to and from a bed to a chair (including a wheelchair)?  -- 1  -CZ     Standing up from a chair using your arms (e.g., wheelchair, bedside chair)?   -- 1  -CZ     Climbing 3-5 steps with a railing?  -- 1  -CZ     To walk in hospital room?  -- 1  -CZ     AM-PAC 6 Clicks Score  -- 8  -CZ        How much help from another is currently needed...    Putting on and taking off regular lower body clothing? 1  -BH  --     Bathing (including washing, rinsing, and drying) 1  -BH  --     Toileting (which includes using toilet bed pan or urinal) 1  -BH  --     Putting on and taking off regular upper body clothing 1  -BH  --     Taking care of personal grooming (such as brushing teeth) 2  -BH  --     Eating meals 2  -BH  --     Score 8  -BH  --        Functional Assessment    Outcome Measure Options AM-PAC 6 Clicks Daily Activity (OT)  - AM-PAC 6 Clicks Basic Mobility (PT)  -       User Key  (r) = Recorded By, (t) = Taken By, (c) = Cosigned By    Initials Name Provider Type     Rea Robbins, OTR/L Occupational Therapist     Geronimo Rivers, PT Physical Therapist           Time Calculation:         PT Charges     Row Name 05/02/18 1058             Time Calculation    Start Time 0822  -      Stop Time 0851  -      Time Calculation (min) 29 min  -CZ      PT Received On 05/02/18  -      PT Goal Re-Cert Due Date 05/02/18  -        User Key  (r) = Recorded By, (t) = Taken By, (c) = Cosigned By    Initials Name Provider Type     Geronimo Rivers, PT Physical Therapist          Therapy Charges for Today     Code Description Service Date Service Provider Modifiers Qty    78889794667 HC PT CHNG MAIN POS CURRENT 5/2/2018 Geronimo Rivers, PT GP, CM 1    99757505374 HC PT CHNG MAIN POS PROJECTED 5/2/2018 Geronimo Rivers PT GP, CM 1    83080021963 HC PT CHNG MAIN POS DISCHARGE 5/2/2018 Geronimo Rivers PT GP, CM 1    25820046662 HC PT EVAL MOD COMPLEXITY 1 5/2/2018 Geronimo Rivers, PT GP 1          PT G-Codes  PT Professional Judgement Used?: Yes  Outcome Measure Options: AM-PAC 6 Clicks Daily Activity (OT)  Score: 8  Functional Limitation: Changing and maintaining body  position  Changing and Maintaining Body Position Current Status (): At least 80 percent but less than 100 percent impaired, limited or restricted  Changing and Maintaining Body Position Goal Status (): At least 80 percent but less than 100 percent impaired, limited or restricted  Changing and Maintaining Body Position Discharge Status (): At least 80 percent but less than 100 percent impaired, limited or restricted    PT Discharge Summary  Anticipated Discharge Disposition (PT): still a patient  Reason for Discharge: Unable to participate  Outcomes Achieved: Unable to tolerate or actively participate in therapy  Discharge Destination: other (comment) (Still a patient; discharge skilled PT. )    Geronimo Rivers, PT  5/2/2018

## 2018-05-02 NOTE — PROGRESS NOTES
FAMILY MEDICINE PROGRESS NOTE  NAME: Tessy Gonzalez  : 1940  MRN: [id number]     LOS: 3 days   Conditional Code  PROVIDER OF SERVICE:     Chief Complaint:  Sepsis due to Klebsiella pneumoniae    Subjective     Interval History:  History taken from: patient chart RN  Subjective: Patient is a 78 yo  female who was admitted with septic shock.  She is peeing better.  She wants to try something else to eat.    Review of Systems:   Review of Systems   Unable to perform ROS: Mental status change       Objective     Vital Signs  Temp:  [98.4 °F (36.9 °C)-99.1 °F (37.3 °C)] 98.7 °F (37.1 °C)  Heart Rate:  [72-88] 72  Resp:  [18] 18  BP: (116-143)/(58-85) 143/65    Physical Exam  Physical Exam   Constitutional: She appears well-developed and well-nourished. No distress.   Appears to be ill   Cardiovascular: Normal rate, regular rhythm, normal heart sounds and intact distal pulses.  Exam reveals no gallop and no friction rub.    No murmur heard.  Pulmonary/Chest: No respiratory distress. She has no wheezes. She has no rales.   Diminished breath sounds in the bases, poor inspiratory effort   Abdominal: Soft. Bowel sounds are normal. She exhibits no distension. There is no tenderness. There is no rebound and no guarding.   Musculoskeletal: She exhibits deformity.   Right lower leg lateral aspect large groove   Neurological:   oriented to place and person only, more awake   Skin: She is not diaphoretic.   Nursing note and vitals reviewed.      Medication Review    Current Facility-Administered Medications:   •  acetaminophen (TYLENOL) tablet 650 mg, 650 mg, Oral, Q6H PRN, Jason Beckman MD  •  dextrose (D50W) solution 25 g, 25 g, Intravenous, Q15 Min PRN, Jason Beckman MD, 25 g at 18 0503  •  dextrose (GLUTOSE) oral gel 15 g, 15 g, Oral, Q15 Min PRN, Jason Beckman MD  •  dextrose 5 % and sodium chloride 0.45 % with KCl 20 mEq/L infusion, 100 mL/hr, Intravenous, Continuous, Glo Kirk MD, Last Rate:  100 mL/hr at 05/02/18 0905, 100 mL/hr at 05/02/18 0905  •  glucagon (human recombinant) (GLUCAGEN DIAGNOSTIC) injection 1 mg, 1 mg, Subcutaneous, PRN, Jason Beckman MD  •  insulin aspart (novoLOG) injection 0-14 Units, 0-14 Units, Subcutaneous, 4x Daily AC & at Bedtime, Jason Beckman MD, 3 Units at 05/02/18 0905  •  insulin detemir (LEVEMIR) injection 10 Units, 10 Units, Subcutaneous, Nightly, Glo Kirk MD  •  levoFLOXacin (LEVAQUIN) 250 mg/50 mL D5W (premix) 250 mg, 250 mg, Intravenous, Q24H, Kulwinder Hernandez MD, 250 mg at 05/01/18 1547  •  magic butt ointment, , Topical, BID, Kulwinder Hernandez MD  •  morphine injection 1 mg, 1 mg, Intravenous, Q4H PRN, 1 mg at 05/01/18 0022 **AND** naloxone (NARCAN) injection 0.4 mg, 0.4 mg, Intravenous, Q5 Min PRN, Jason Beckman MD  •  nystatin (MYCOSTATIN) powder, , Topical, Q12H, Jason Beckman MD  •  Pharmacy to Dose LevoFLOXacin (LEVAQUIN), , Does not apply, Continuous PRN, Kulwinder Hernandez MD  •  sodium chloride 0.9 % flush 1-10 mL, 1-10 mL, Intravenous, PRN, Jason Beckman MD     Diagnostic Data      I reviewed the patient's new clinical results and imaging.      Assessment/Plan     Active Hospital Problems (** Indicates Principal Problem)    Diagnosis Date Noted   • **Sepsis due to Klebsiella pneumoniae [A41.4] 04/29/2018   • Acute on chronic renal failure [N17.9, N18.9] 04/30/2018   • Bacteremia due to Klebsiella pneumoniae [R78.81] 04/30/2018   • UTI (urinary tract infection) [N39.0] 04/29/2018   • Long-term insulin use in type 2 diabetes [E11.9, Z79.4] 04/29/2018   • Morbid obesity [E66.01]       Resolved Hospital Problems    Diagnosis Date Noted Date Resolved   • Septic shock [A41.9, R65.21] 04/29/2018 05/01/2018     Stop the Zosyn and continue Levaquin.  Advance diet as tolerated.  Consult speech for swallow evaluation.  Decrease IVF and give an amp of bicarb.    DVT prophylaxis: SCDs/TEDs      Disposition:uncertain at this point          This document has been  electronically signed by Glo Kirk MD on May 2, 2018 9:22 AM

## 2018-05-02 NOTE — PLAN OF CARE
Problem: Patient Care Overview  Goal: Plan of Care Review  Outcome: Ongoing (interventions implemented as appropriate)   05/02/18 0823   Coping/Psychosocial   Plan of Care Reviewed With family;patient   OTHER   Outcome Summary OT steffen completed this date with family present. Pt was lethargic but awoke for activities. Pt engaged inBUE ROM and strength testing with her LUE weaker then her RUE. Pt struggled with hand washing and fatigued with the task and did not fully complete. Pt declined to engage in any ROM or movement with BLE and did not want to engage in any bed mobility. Pt could benefit from skilled OT to increase BUE strength and endurance and independence with ADL especially self feeding and grooming as pt was able to use her BUE to assist with bed mobility and feed herself prior to getting sick. Recommend d/c home with 24/7 and possibly more therapy to get stronger.

## 2018-05-02 NOTE — PLAN OF CARE
Problem: Patient Care Overview  Goal: Plan of Care Review  Outcome: Unable to achieve outcome(s) by discharge Date Met: 05/02/18 05/02/18 0822   Coping/Psychosocial   Plan of Care Reviewed With patient;caregiver;daughter   OTHER   Outcome Summary Initial PT evaluation performed. Patient is lethargic, limited by c/o arthritic pain in LE joints, decreased cognition and previous functional deficits. Patient is unable/unwilling to participate in assessment of LE ROM/ strength, is resistant to performing bed mobility task. No further PT indicated at this time. Please provide new PT orders if situation should change/improve. Physician notified. Skilled PT discharged.

## 2018-05-02 NOTE — PLAN OF CARE
Problem: Patient Care Overview  Goal: Plan of Care Review   05/02/18 1537   Coping/Psychosocial   Plan of Care Reviewed With patient;family   Plan of Care Review   Progress improving   OTHER   Outcome Summary Patient working with therapy, Total care patient, decreased wbc, no falls this shift skin red but blanchable, excoriation to folds noted and behind right knee yeast replacement, pain controled with meds     Goal: Individualization and Mutuality  Outcome: Ongoing (interventions implemented as appropriate)    Goal: Discharge Needs Assessment  Outcome: Ongoing (interventions implemented as appropriate)    Goal: Interprofessional Rounds/Family Conf  Outcome: Ongoing (interventions implemented as appropriate)      Problem: Fall Risk (Adult)  Goal: Absence of Fall  Outcome: Ongoing (interventions implemented as appropriate)      Problem: Skin Injury Risk (Adult)  Goal: Skin Health and Integrity  Outcome: Ongoing (interventions implemented as appropriate)      Problem: Urinary Tract Infection (Adult)  Goal: Signs and Symptoms of Listed Potential Problems Will be Absent, Minimized or Managed (Urinary Tract Infection)  Outcome: Ongoing (interventions implemented as appropriate)      Problem: Pain, Chronic (Adult)  Goal: Identify Related Risk Factors and Signs and Symptoms  Outcome: Ongoing (interventions implemented as appropriate)      Problem: Sepsis/Septic Shock (Adult)  Goal: Signs and Symptoms of Listed Potential Problems Will be Absent, Minimized or Managed (Sepsis/Septic Shock)  Outcome: Ongoing (interventions implemented as appropriate)

## 2018-05-03 LAB
ALBUMIN SERPL-MCNC: 2.4 G/DL (ref 3.4–4.8)
ALBUMIN/GLOB SERPL: 0.8 G/DL (ref 1.1–1.8)
ALP SERPL-CCNC: 84 U/L (ref 38–126)
ALT SERPL W P-5'-P-CCNC: 26 U/L (ref 9–52)
ANION GAP SERPL CALCULATED.3IONS-SCNC: 7 MMOL/L (ref 5–15)
AST SERPL-CCNC: 17 U/L (ref 14–36)
BASOPHILS # BLD AUTO: 0.05 10*3/MM3 (ref 0–0.2)
BASOPHILS NFR BLD AUTO: 0.2 % (ref 0–2)
BILIRUB SERPL-MCNC: 0.5 MG/DL (ref 0.2–1.3)
BUN BLD-MCNC: 38 MG/DL (ref 7–21)
BUN/CREAT SERPL: 29.2 (ref 7–25)
CALCIUM SPEC-SCNC: 8.6 MG/DL (ref 8.4–10.2)
CHLORIDE SERPL-SCNC: 108 MMOL/L (ref 95–110)
CO2 SERPL-SCNC: 22 MMOL/L (ref 22–31)
CREAT BLD-MCNC: 1.3 MG/DL (ref 0.5–1)
DEPRECATED RDW RBC AUTO: 41.6 FL (ref 36.4–46.3)
EOSINOPHIL # BLD AUTO: 0.53 10*3/MM3 (ref 0–0.7)
EOSINOPHIL NFR BLD AUTO: 2.5 % (ref 0–7)
ERYTHROCYTE [DISTWIDTH] IN BLOOD BY AUTOMATED COUNT: 12.9 % (ref 11.5–14.5)
GFR SERPL CREATININE-BSD FRML MDRD: 40 ML/MIN/1.73 (ref 39–90)
GLOBULIN UR ELPH-MCNC: 3.2 GM/DL (ref 2.3–3.5)
GLUCOSE BLD-MCNC: 193 MG/DL (ref 60–100)
GLUCOSE BLDC GLUCOMTR-MCNC: 194 MG/DL (ref 70–130)
GLUCOSE BLDC GLUCOMTR-MCNC: 242 MG/DL (ref 70–130)
GLUCOSE BLDC GLUCOMTR-MCNC: 255 MG/DL (ref 70–130)
GLUCOSE BLDC GLUCOMTR-MCNC: 285 MG/DL (ref 70–130)
HCT VFR BLD AUTO: 32.5 % (ref 35–45)
HGB BLD-MCNC: 10.9 G/DL (ref 12–15.5)
IMM GRANULOCYTES # BLD: 0.26 10*3/MM3 (ref 0–0.02)
IMM GRANULOCYTES NFR BLD: 1.2 % (ref 0–0.5)
LYMPHOCYTES # BLD AUTO: 2.95 10*3/MM3 (ref 0.6–4.2)
LYMPHOCYTES NFR BLD AUTO: 13.9 % (ref 10–50)
MCH RBC QN AUTO: 29.9 PG (ref 26.5–34)
MCHC RBC AUTO-ENTMCNC: 33.5 G/DL (ref 31.4–36)
MCV RBC AUTO: 89 FL (ref 80–98)
MONOCYTES # BLD AUTO: 2.01 10*3/MM3 (ref 0–0.9)
MONOCYTES NFR BLD AUTO: 9.4 % (ref 0–12)
NEUTROPHILS # BLD AUTO: 15.49 10*3/MM3 (ref 2–8.6)
NEUTROPHILS NFR BLD AUTO: 72.8 % (ref 37–80)
PLATELET # BLD AUTO: 120 10*3/MM3 (ref 150–450)
PMV BLD AUTO: 11.7 FL (ref 8–12)
POTASSIUM BLD-SCNC: 3.8 MMOL/L (ref 3.5–5.1)
PROT SERPL-MCNC: 5.6 G/DL (ref 6.3–8.6)
RBC # BLD AUTO: 3.65 10*6/MM3 (ref 3.77–5.16)
SODIUM BLD-SCNC: 137 MMOL/L (ref 137–145)
WBC NRBC COR # BLD: 21.29 10*3/MM3 (ref 3.2–9.8)

## 2018-05-03 PROCEDURE — 85025 COMPLETE CBC W/AUTO DIFF WBC: CPT | Performed by: FAMILY MEDICINE

## 2018-05-03 PROCEDURE — 97530 THERAPEUTIC ACTIVITIES: CPT

## 2018-05-03 PROCEDURE — 80053 COMPREHEN METABOLIC PANEL: CPT | Performed by: FAMILY MEDICINE

## 2018-05-03 PROCEDURE — 63710000001 INSULIN ASPART PER 5 UNITS: Performed by: INTERNAL MEDICINE

## 2018-05-03 PROCEDURE — 25010000002 MORPHINE PER 10 MG: Performed by: INTERNAL MEDICINE

## 2018-05-03 PROCEDURE — 99232 SBSQ HOSP IP/OBS MODERATE 35: CPT | Performed by: FAMILY MEDICINE

## 2018-05-03 PROCEDURE — 82962 GLUCOSE BLOOD TEST: CPT

## 2018-05-03 PROCEDURE — 63710000001 INSULIN DETEMIR PER 5 UNITS: Performed by: FAMILY MEDICINE

## 2018-05-03 PROCEDURE — 25010000002 LEVOFLOXACIN PER 250 MG: Performed by: FAMILY MEDICINE

## 2018-05-03 RX ADMIN — HYDROCORTISONE: 1 CREAM TOPICAL at 21:09

## 2018-05-03 RX ADMIN — HYDROCORTISONE: 1 CREAM TOPICAL at 08:18

## 2018-05-03 RX ADMIN — INSULIN ASPART 8 UNITS: 100 INJECTION, SOLUTION INTRAVENOUS; SUBCUTANEOUS at 17:29

## 2018-05-03 RX ADMIN — INSULIN ASPART 5 UNITS: 100 INJECTION, SOLUTION INTRAVENOUS; SUBCUTANEOUS at 21:09

## 2018-05-03 RX ADMIN — INSULIN DETEMIR 10 UNITS: 100 INJECTION, SOLUTION SUBCUTANEOUS at 21:09

## 2018-05-03 RX ADMIN — INSULIN ASPART 8 UNITS: 100 INJECTION, SOLUTION INTRAVENOUS; SUBCUTANEOUS at 11:44

## 2018-05-03 RX ADMIN — INSULIN ASPART 3 UNITS: 100 INJECTION, SOLUTION INTRAVENOUS; SUBCUTANEOUS at 08:18

## 2018-05-03 RX ADMIN — POTASSIUM CHLORIDE, DEXTROSE MONOHYDRATE AND SODIUM CHLORIDE 100 ML/HR: 150; 5; 450 INJECTION, SOLUTION INTRAVENOUS at 03:40

## 2018-05-03 RX ADMIN — NYSTATIN: 100000 POWDER TOPICAL at 08:18

## 2018-05-03 RX ADMIN — MORPHINE SULFATE 1 MG: 4 INJECTION, SOLUTION INTRAMUSCULAR; INTRAVENOUS at 17:34

## 2018-05-03 RX ADMIN — LEVOFLOXACIN 250 MG: 5 INJECTION, SOLUTION INTRAVENOUS at 15:52

## 2018-05-03 RX ADMIN — NYSTATIN: 100000 POWDER TOPICAL at 21:09

## 2018-05-03 RX ADMIN — POTASSIUM CHLORIDE, DEXTROSE MONOHYDRATE AND SODIUM CHLORIDE 100 ML/HR: 150; 5; 450 INJECTION, SOLUTION INTRAVENOUS at 15:52

## 2018-05-03 NOTE — CONSULTS
Adult Nutrition  Assessment    Patient Name:  Tessy Gonzalez  YOB: 1940  MRN: [id number]  Admit Date:  4/29/2018    Assessment Date:  5/3/2018    Comments:  Pt on RD list for wound--however family denies any wounds presently. Pt admitted r/t UTI/sepsis. Appetite has been low since admit, but family reports pt normally eats very well. Food prefs voiced. Will add Glucerna to supper tray, as family reports pt's intake usually decreases later in the day. Will monitor.           Adult Nutrition Assessment     Row Name 05/03/18 1107       PO Evaluation    Number of Meals 1    % PO Intake 25%    Row Name 05/03/18 1106       Calculation Measurements    Weight Used For Calculations 59 kg (130 lb)       Estimated/Assessed Needs    Additional Documentation Calorie Requirements (Group);Fluid Requirements (Group);Protein Requirements (Group)       Calorie Requirements    Estimated Calorie Requirement (kcal/day) 1500    Estimated Calorie Need Method kcal/kg       KCAL/KG    14 Kcal/Kg (kcal) 825.55    15 Kcal/Kg (kcal) 884.52    18 Kcal/Kg (kcal) 1061.42    20 Kcal/Kg (kcal) 1179.36    25 Kcal/Kg (kcal) 1474.2    30 Kcal/Kg (kcal) 1769.04    35 Kcal/Kg (kcal) 2063.88    40 Kcal/Kg (kcal) 2358.72    45 Kcal/Kg (kcal) 2653.56    50 Kcal/Kg (kcal) 2948.4       Boone-St. Jeor Equation    RMR (Boone-St. Jeor Equation) 1101.43       Protein Requirements    Est Protein Requirement Amount (gms/kg) 0.8 gm protein    Estimated Protein Requirements (gms/day) 47.17       Fluid Requirements    Estimated Fluid Requirements (mL/day) 1500    Estimated Fluid Requirement Method RDA Method    RDA Method (mL) 1500    [place] Method (over 20 kg) 2679.36       Nutrition Prescription PO    Current PO Diet Soft Texture    Texture Ground    Fluid Consistency Thin    Common Modifiers Consistent Carbohydrate    Row Name 05/03/18 1109       Labs/Procedures/Meds    Lab Results Reviewed reviewed, pertinent    Row Name 05/03/18 1103        Nutrition/Diet History    Typical Food/Fluid Intake Pt sleeping, spoke w/daughters who report pt normally has good appetite but has not since she came to the hospital. Denies gi distress. Food prefs voiced. Will send glucerna w/supper to optimize intake.     Row Name 05/03/18 1103       Reason for Assessment    Reason For Assessment per organizational policy          Electronically signed by:  Marilin Miller RD  05/03/18 11:09 AM

## 2018-05-03 NOTE — THERAPY TREATMENT NOTE
Acute Care - Occupational Therapy Treatment Note  [facility] [place]     Patient Name: Tessy Gonzalez  : 1940  MRN: [id number]  Today's Date: 5/3/2018  Onset of Illness/Injury or Date of Surgery: 18  Date of Referral to OT: 18  Referring Physician: Dr. Glo Kirk    Admit Date: 2018       ICD-10-CM ICD-9-CM   1. Impaired mobility and ADLs Z74.09 799.89   2. Impaired physical mobility Z74.09 781.99     Patient Active Problem List   Diagnosis   • Sepsis due to Klebsiella pneumoniae   • UTI (urinary tract infection)   • Hypertension   • Long-term insulin use in type 2 diabetes   • CKD (chronic kidney disease) stage 3, GFR 30-59 ml/min   • Morbid obesity   • Acute on chronic renal failure   • Bacteremia due to Klebsiella pneumoniae     Past Medical History:   Diagnosis Date   • Atherosclerosis    • Basal cell carcinoma    • Basal cell carcinoma of medial canthus, left    • Basal cell carcinoma of nose 2000    right lateral    • Bilateral leg edema    • Cellulitis    • CKD (chronic kidney disease) stage 3, GFR 30-59 ml/min    • Degenerative joint disease involving multiple joints    • Dementia    • Depression    • Diabetes mellitus    • Diabetic nephropathy    • Diabetic neuropathy    • Diastolic dysfunction    • Foot abscess    • GERD (gastroesophageal reflux disease)    • Hypertension    • Ischemic stroke    • Morbid obesity    • Peripheral venous insufficiency    • Recurrent UTI (urinary tract infection)      Past Surgical History:   Procedure Laterality Date   • BIOPSY SKIN / SQ / MUCOUS MEMBRANE     • HIP ARTHROPLASTY Right    • SKIN GRAFT      taken from the neck for the face   • VEIN LIGATION AND STRIPPING BILATERAL         Therapy Treatment          Rehabilitation Treatment Summary     Row Name 18 1424             Treatment Time/Intention    Discipline occupational therapy assistant  -BB      Document Type therapy note (daily note)  -BB      Subjective Information no complaints   -BB      Patient/Family Observations pt alone in room  -BB      Care Plan Review care plan/treatment goals reviewed  -BB      Total Minutes, Occupational Therapy Treatment 24  -BB      Therapy Frequency (OT Eval) other (see comments)   3-14 tx's/wk  -BB      Patient Effort adequate  -BB      Existing Precautions/Restrictions fall;oxygen therapy device and L/min  -BB      Recorded by [BB] TORI TeixeiraA/FATUMA 05/03/18 1530 05/03/18 1530      Row Name 05/03/18 1424             Vital Signs    Pretreatment Heart Rate (beats/min) 76  -BB      Pre SpO2 (%) 94  -BB      O2 Delivery Pre Treatment supplemental O2  -BB      Pre Patient Position Supine  -BB      Recorded by [BB] ODALIS Teixeira/FATUMA 05/03/18 1530 05/03/18 1530      Row Name 05/03/18 1424             Cognitive Assessment/Intervention- PT/OT    Orientation Status (Cognition) oriented to;person  -BB      Recorded by [BB] ODALIS Teixeira/FATUMA 05/03/18 1530 05/03/18 1530      Row Name 05/03/18 1424             Therapeutic Exercise    Upper Extremity Range of Motion (Therapeutic Exercise) shoulder flexion/extension, bilateral;elbow flexion/extension, bilateral;wrist flexion/extension, bilateral;forearm supination/pronation, bilateral  -BB      Hand (Therapeutic Exercise) finger flexion/extension, bilateral  -BB      Position (Therapeutic Exercise) supine  -BB      Sets/Reps (Therapeutic Exercise) 1 set x10 reps with min verbal cues  -BB      Recorded by [BB] ODALIS Teixeira/FATUMA 05/03/18 1530 05/03/18 1530      Row Name 05/03/18 1424             Positioning and Restraints    Pre-Treatment Position in bed  -BB      Post Treatment Position bed  -BB      In Bed supine  -BB      Recorded by [BB] ODALIS Teixeira/FATUMA 05/03/18 1530 05/03/18 1530      Row Name 05/03/18 1424             Pain Assessment    Additional Documentation Pain Scale: Numbers Pre/Post-Treatment (Group)  -BB      Recorded by [BB] TORI TeixeiraA/FATUMA 05/03/18 1530  05/03/18 1530      Row Name 05/03/18 1424             Pain Scale: Numbers Pre/Post-Treatment    Pain Scale: Numbers, Pretreatment 0/10 - no pain  -BB      Pain Scale: Numbers, Post-Treatment 0/10 - no pain  -BB      Recorded by [BB] ODALIS Teixeira/FATUMA 05/03/18 1530 05/03/18 1530      Row Name                Wound 05/01/18 1700 Right      Wound - Properties Group Date first assessed: 05/01/18 [MM] Time first assessed: 1700 [MM] Present On Admission : no [MM] Side: Right [MM] Recorded by:  [MM] Celeste Plata RN 05/01/18 1840 05/01/18 1840    Row Name                Wound 05/01/18 1700 Bilateral      Wound - Properties Group Date first assessed: 05/01/18 [MM] Time first assessed: 1700 [MM] Present On Admission : no [MM] Side: Bilateral [MM] Recorded by:  [MM] Celeste Plata RN 05/01/18 1841 05/01/18 1841    Row Name                Wound 05/02/18 0700 Left  abrasion    Wound - Properties Group Date first assessed: 05/02/18 [MM] Time first assessed: 0700 [MM] Present On Admission : no [MM] Side: Left [MM] Type: abrasion [MM] Recorded by:  [MM] Celeste Plata RN 05/02/18 1933 05/02/18 1933    Row Name 05/03/18 1424             Plan of Care Review    Plan of Care Reviewed With patient  -BB      Recorded by [BB] ODALIS Teixeira/FATUMA 05/03/18 1530 05/03/18 1530      Row Name 05/03/18 1424             Outcome Summary/Treatment Plan (OT)    Daily Summary of Progress (OT) progress towards functional goals is fair  -BB      Plan for Continued Treatment (OT) continue POC  -BB      Anticipated Discharge Disposition (OT) skilled nursing facility (SNF)   home with 24/7 care  -BB      Recorded by [BB] ODALIS Teixeria/FATUMA 05/03/18 1530 05/03/18 1530        User Key  (r) = Recorded By, (t) = Taken By, (c) = Cosigned By    Initials Name Effective Dates Discipline    MM Celeste Plata RN 10/17/16 -  Nurse    BB Shreya Vazquez, JACOBO/L 03/07/18 -  OT        Wound 05/01/18 1700 Right   (Active)    Dressing Appearance open to air 5/3/2018  8:16 AM   Base pink 5/3/2018  8:16 AM   Drainage Amount none 5/3/2018  8:16 AM   Care, Wound barrier applied 5/3/2018  8:16 AM       Wound 05/01/18 1700 Bilateral   (Active)   Dressing Appearance open to air 5/3/2018  8:16 AM   Base other (see comments) 5/2/2018  8:27 PM   Drainage Amount none 5/3/2018  8:16 AM   Care, Wound barrier applied 5/3/2018  8:16 AM       Wound 05/02/18 0700 Left  abrasion (Active)   Dressing Appearance open to air 5/3/2018  8:16 AM   Base red/granulating 5/3/2018  8:16 AM             OT Rehab Goals     Row Name 05/03/18 1424             Grooming Goal 1 (OT)    Activity/Device (Grooming Goal 1, OT) oral care;wash face, hands  -BB      [place] (Grooming Goal 1, OT) set-up required;tactile cues required;verbal cues required;minimum assist (75% or more patient effort)  -BB      Time Frame (Grooming Goal 1, OT) long term goal (LTG);by discharge  -BB      Progress/Outcome (Grooming Goal 1, OT) goal not met;continuing progress toward goal  -BB         Self-Feeding Goal 1 (OT)    Activity/Assistive Device (Self-Feeding Goal 1, OT) self-feeding skills, all  -BB      [place] Level/Cues Needed (Self-Feeding Goal 1, OT) set-up required;supervision required;tactile cues required;verbal cues required  -BB      Time Frame (Self-Feeding Goal 1, OT) long term goal (LTG);by discharge  -BB      Progress/Outcomes (Self-Feeding Goal 1, OT) goal not met;continuing progress toward goal  -BB        User Key  (r) = Recorded By, (t) = Taken By, (c) = Cosigned By    Initials Name Provider Type Discipline    BB ANNALISE Teixeira Occupational Therapy Assistant OT        Occupational Therapy Education     Title: PT OT SLP Therapies (Active)     Topic: Occupational Therapy (Active)     Point: ADL training (Done)     Description: Instruct learner(s) on proper safety adaptation and remediation techniques during self care or transfers.   Instruct in proper use  of assistive devices.   Learning Progress Summary     Learner Status Readiness Method Response Comment Documented by    Patient Done Acceptance E MARII,NR Educated about OT and POC. Educated to call for assist.  05/02/18 1049    Family Done Acceptance E MARII,NR Educated about OT and POC. Educated to call for assist.  05/02/18 1049          Point: Home exercise program (Active)     Description: Instruct learner(s) on appropriate technique for monitoring, assisting and/or progressing therapeutic exercises/activities.   Learning Progress Summary     Learner Status Readiness Method Response Comment Documented by    Patient Active Acceptance E NR   05/03/18 1531                      User Key     Initials Effective Dates Name Provider Type Discipline     10/17/16 -  YENIFER Edwards/L Occupational Therapist OT     03/07/18 -  ODALIS Teixeira/L Occupational Therapy Assistant OT                OT Recommendation and Plan  Outcome Summary/Treatment Plan (OT)  Daily Summary of Progress (OT): progress towards functional goals is fair  Plan for Continued Treatment (OT): continue POC  Anticipated Discharge Disposition (OT): skilled nursing facility (SNF) (home with 24/7 care)  Therapy Frequency (OT Eval): other (see comments) (3-14 tx's/wk)  Daily Summary of Progress (OT): progress towards functional goals is fair  Plan of Care Review  Plan of Care Reviewed With: patient  Plan of Care Reviewed With: patient  Outcome Summary: Pt performed B UE exercises with min A. No new goals met this tx. Pt in supine iwth all needs in reach at end of OT tx.        Outcome Measures     Row Name 05/03/18 1424 05/02/18 0823 05/02/18 0822       How much help from another person do you currently need...    Turning from your back to your side while in flat bed without using bedrails?  --  -- 2  -CZ    Moving from lying on back to sitting on the side of a flat bed without bedrails?  --  -- 2  -CZ    Moving to and from a bed to a chair  (including a wheelchair)?  --  -- 1  -CZ    Standing up from a chair using your arms (e.g., wheelchair, bedside chair)?  --  -- 1  -CZ    Climbing 3-5 steps with a railing?  --  -- 1  -CZ    To walk in hospital room?  --  -- 1  -CZ    AM-PAC 6 Clicks Score  --  -- 8  -CZ       How much help from another is currently needed...    Putting on and taking off regular lower body clothing? 1  -BB 1  -BH  --    Bathing (including washing, rinsing, and drying) 1  -BB 1  -BH  --    Toileting (which includes using toilet bed pan or urinal) 1  -BB 1  -BH  --    Putting on and taking off regular upper body clothing 1  -BB 1  -BH  --    Taking care of personal grooming (such as brushing teeth) 2  -BB 2  -BH  --    Eating meals 2  -BB 2  -BH  --    Score 8  -BB 8  -BH  --       Functional Assessment    Outcome Measure Options  -- AM-PAC 6 Clicks Daily Activity (OT)  - AM-PAC 6 Clicks Basic Mobility (PT)  -      User Key  (r) = Recorded By, (t) = Taken By, (c) = Cosigned By    Initials Name Provider Type     Rea Robbins, OTR/L Occupational Therapist    BB ODALIS Teixeira/L Occupational Therapy Assistant    CZ Geronimo Rivers, PT Physical Therapist           Time Calculation:         Time Calculation- OT     Row Name 05/03/18 1532             Time Calculation- OT    OT Start Time 1424  -BB      OT Stop Time 1448  -      OT Time Calculation (min) 24 min  -      Total Timed Code Minutes- OT 24 minute(s)  -      OT Received On 05/03/18  -        User Key  (r) = Recorded By, (t) = Taken By, (c) = Cosigned By    Initials Name Provider Type     ODALIS Teixeira/L Occupational Therapy Assistant           Therapy Charges for Today     Code Description Service Date Service Provider Modifiers Qty    [id number]  OT THERAPEUTIC ACT EA 15 MIN 5/3/2018 ODALIS Teixeira/L GO 2          OT G-codes  OT Professional Judgement Used?: Yes  OT Functional Scales Options: AM-PAC 6 Clicks Daily Activity  (OT)  Score: 8  Functional Limitation: Self care  Self Care Current Status (): At least 80 percent but less than 100 percent impaired, limited or restricted  Self Care Goal Status (): At least 60 percent but less than 80 percent impaired, limited or restricted    ODALIS Teixeira/FATUMA  5/3/2018

## 2018-05-03 NOTE — PLAN OF CARE
Problem: Patient Care Overview  Goal: Plan of Care Review  Outcome: Ongoing (interventions implemented as appropriate)   05/03/18 1531   Coping/Psychosocial   Plan of Care Reviewed With patient   Plan of Care Review   Progress no change   OTHER   Outcome Summary Pt performed B UE exercises with min A. No new goals met this tx. Pt in supine iwth all needs in reach at end of OT tx.

## 2018-05-03 NOTE — PLAN OF CARE
Problem: Patient Care Overview  Goal: Plan of Care Review  Outcome: Ongoing (interventions implemented as appropriate)   05/03/18 1514   Coping/Psychosocial   Plan of Care Reviewed With patient   Plan of Care Review   Progress no change   OTHER   Outcome Summary pt vss. Pt only complains of pain when you move her     Goal: Individualization and Mutuality  Outcome: Ongoing (interventions implemented as appropriate)    Goal: Discharge Needs Assessment  Outcome: Ongoing (interventions implemented as appropriate)      Problem: Fall Risk (Adult)  Goal: Absence of Fall  Outcome: Ongoing (interventions implemented as appropriate)      Problem: Skin Injury Risk (Adult)  Goal: Skin Health and Integrity  Outcome: Ongoing (interventions implemented as appropriate)      Problem: Urinary Tract Infection (Adult)  Goal: Signs and Symptoms of Listed Potential Problems Will be Absent, Minimized or Managed (Urinary Tract Infection)  Outcome: Ongoing (interventions implemented as appropriate)      Problem: Pain, Chronic (Adult)  Goal: Identify Related Risk Factors and Signs and Symptoms  Outcome: Outcome(s) achieved Date Met: 05/03/18    Goal: Acceptable Pain/Comfort Level and Functional Ability  Outcome: Ongoing (interventions implemented as appropriate)      Problem: Sepsis/Septic Shock (Adult)  Goal: Signs and Symptoms of Listed Potential Problems Will be Absent, Minimized or Managed (Sepsis/Septic Shock)  Outcome: Ongoing (interventions implemented as appropriate)

## 2018-05-03 NOTE — PLAN OF CARE
Problem: Patient Care Overview  Goal: Plan of Care Review  Outcome: Ongoing (interventions implemented as appropriate)   05/03/18 0413   Coping/Psychosocial   Plan of Care Reviewed With patient   Plan of Care Review   Progress no change   OTHER   Outcome Summary vitals stable, no complaints of pain; pulled out cantu and had to anchor another cantu, will continue to monitor     Goal: Individualization and Mutuality  Outcome: Ongoing (interventions implemented as appropriate)    Goal: Discharge Needs Assessment  Outcome: Ongoing (interventions implemented as appropriate)    Goal: Interprofessional Rounds/Family Conf  Outcome: Ongoing (interventions implemented as appropriate)      Problem: Fall Risk (Adult)  Goal: Absence of Fall  Outcome: Ongoing (interventions implemented as appropriate)      Problem: Skin Injury Risk (Adult)  Goal: Skin Health and Integrity  Outcome: Ongoing (interventions implemented as appropriate)      Problem: Urinary Tract Infection (Adult)  Goal: Signs and Symptoms of Listed Potential Problems Will be Absent, Minimized or Managed (Urinary Tract Infection)  Outcome: Ongoing (interventions implemented as appropriate)      Problem: Pain, Chronic (Adult)  Goal: Identify Related Risk Factors and Signs and Symptoms  Outcome: Ongoing (interventions implemented as appropriate)    Goal: Acceptable Pain/Comfort Level and Functional Ability  Outcome: Ongoing (interventions implemented as appropriate)      Problem: Sepsis/Septic Shock (Adult)  Goal: Signs and Symptoms of Listed Potential Problems Will be Absent, Minimized or Managed (Sepsis/Septic Shock)  Outcome: Ongoing (interventions implemented as appropriate)

## 2018-05-04 ENCOUNTER — APPOINTMENT (OUTPATIENT)
Dept: CARDIOLOGY | Facility: HOSPITAL | Age: 78
End: 2018-05-04
Attending: FAMILY MEDICINE

## 2018-05-04 ENCOUNTER — APPOINTMENT (OUTPATIENT)
Dept: GENERAL RADIOLOGY | Facility: HOSPITAL | Age: 78
End: 2018-05-04

## 2018-05-04 ENCOUNTER — APPOINTMENT (OUTPATIENT)
Dept: CT IMAGING | Facility: HOSPITAL | Age: 78
End: 2018-05-04

## 2018-05-04 PROBLEM — I20.8 ANGINA AT REST (HCC): Status: ACTIVE | Noted: 2018-05-04

## 2018-05-04 PROBLEM — R10.32 LEFT LOWER QUADRANT PAIN: Status: ACTIVE | Noted: 2018-05-04

## 2018-05-04 PROBLEM — G89.4 CHRONIC PAIN SYNDROME: Status: ACTIVE | Noted: 2018-05-04

## 2018-05-04 PROBLEM — F03.918 DEMENTIA WITH BEHAVIORAL DISTURBANCE (HCC): Status: ACTIVE | Noted: 2018-05-04

## 2018-05-04 PROBLEM — F41.1 GENERALIZED ANXIETY DISORDER: Status: ACTIVE | Noted: 2018-05-04

## 2018-05-04 LAB
ALBUMIN SERPL-MCNC: 2.4 G/DL (ref 3.4–4.8)
ALBUMIN/GLOB SERPL: 0.7 G/DL (ref 1.1–1.8)
ALP SERPL-CCNC: 98 U/L (ref 38–126)
ALT SERPL W P-5'-P-CCNC: 28 U/L (ref 9–52)
ANION GAP SERPL CALCULATED.3IONS-SCNC: 8 MMOL/L (ref 5–15)
AST SERPL-CCNC: 14 U/L (ref 14–36)
BH CV ECHO MEAS - ACS: 2 CM
BH CV ECHO MEAS - AO MAX PG (FULL): 3 MMHG
BH CV ECHO MEAS - AO MAX PG: 8.2 MMHG
BH CV ECHO MEAS - AO MEAN PG (FULL): 2 MMHG
BH CV ECHO MEAS - AO MEAN PG: 5 MMHG
BH CV ECHO MEAS - AO ROOT AREA (BSA CORRECTED): 1.4
BH CV ECHO MEAS - AO ROOT AREA: 7.1 CM^2
BH CV ECHO MEAS - AO ROOT DIAM: 3 CM
BH CV ECHO MEAS - AO V2 MAX: 143 CM/SEC
BH CV ECHO MEAS - AO V2 MEAN: 106 CM/SEC
BH CV ECHO MEAS - AO V2 VTI: 33.9 CM
BH CV ECHO MEAS - ASC AORTA: 2.9 CM
BH CV ECHO MEAS - AVA(I,A): 2.8 CM^2
BH CV ECHO MEAS - AVA(I,D): 2.8 CM^2
BH CV ECHO MEAS - AVA(V,A): 3 CM^2
BH CV ECHO MEAS - AVA(V,D): 3 CM^2
BH CV ECHO MEAS - BSA(HAYCOCK): 2.3 M^2
BH CV ECHO MEAS - BSA: 2.2 M^2
BH CV ECHO MEAS - BZI_BMI: 39.7 KILOGRAMS/M^2
BH CV ECHO MEAS - BZI_METRIC_HEIGHT: 167.6 CM
BH CV ECHO MEAS - BZI_METRIC_WEIGHT: 111.6 KG
BH CV ECHO MEAS - EDV(CUBED): 65.9 ML
BH CV ECHO MEAS - EDV(MOD-SP4): 57.3 ML
BH CV ECHO MEAS - EDV(TEICH): 71.7 ML
BH CV ECHO MEAS - EF(CUBED): 62.6 %
BH CV ECHO MEAS - EF(TEICH): 54.7 %
BH CV ECHO MEAS - ESV(CUBED): 24.6 ML
BH CV ECHO MEAS - ESV(TEICH): 32.5 ML
BH CV ECHO MEAS - FS: 28 %
BH CV ECHO MEAS - IVS/LVPW: 1.1
BH CV ECHO MEAS - IVSD: 1 CM
BH CV ECHO MEAS - LA DIMENSION: 3.7 CM
BH CV ECHO MEAS - LA/AO: 1.2
BH CV ECHO MEAS - LV DIASTOLIC VOL/BSA (35-75): 26.2 ML/M^2
BH CV ECHO MEAS - LV MASS(C)D: 124.6 GRAMS
BH CV ECHO MEAS - LV MASS(C)DI: 57 GRAMS/M^2
BH CV ECHO MEAS - LV MAX PG: 5.2 MMHG
BH CV ECHO MEAS - LV MEAN PG: 3 MMHG
BH CV ECHO MEAS - LV V1 MAX: 114 CM/SEC
BH CV ECHO MEAS - LV V1 MEAN: 77.3 CM/SEC
BH CV ECHO MEAS - LV V1 VTI: 25 CM
BH CV ECHO MEAS - LVIDD: 4 CM
BH CV ECHO MEAS - LVIDS: 2.9 CM
BH CV ECHO MEAS - LVLD AP4: 6.2 CM
BH CV ECHO MEAS - LVOT AREA (M): 3.8 CM^2
BH CV ECHO MEAS - LVOT AREA: 3.8 CM^2
BH CV ECHO MEAS - LVOT DIAM: 2.2 CM
BH CV ECHO MEAS - LVPWD: 0.94 CM
BH CV ECHO MEAS - MR MAX PG: 94.5 MMHG
BH CV ECHO MEAS - MR MAX VEL: 486 CM/SEC
BH CV ECHO MEAS - MV A MAX VEL: 83.4 CM/SEC
BH CV ECHO MEAS - MV DEC SLOPE: 384 CM/SEC^2
BH CV ECHO MEAS - MV E MAX VEL: 79.1 CM/SEC
BH CV ECHO MEAS - MV E/A: 0.95
BH CV ECHO MEAS - MV P1/2T MAX VEL: 96.9 CM/SEC
BH CV ECHO MEAS - MV P1/2T: 73.9 MSEC
BH CV ECHO MEAS - MVA P1/2T LCG: 2.3 CM^2
BH CV ECHO MEAS - MVA(P1/2T): 3 CM^2
BH CV ECHO MEAS - PA MAX PG: 3 MMHG
BH CV ECHO MEAS - PA V2 MAX: 87.3 CM/SEC
BH CV ECHO MEAS - RAP SYSTOLE: 5 MMHG
BH CV ECHO MEAS - RVDD: 3 CM
BH CV ECHO MEAS - RVSP: 35.5 MMHG
BH CV ECHO MEAS - SI(AO): 109.7 ML/M^2
BH CV ECHO MEAS - SI(CUBED): 18.9 ML/M^2
BH CV ECHO MEAS - SI(LVOT): 43.5 ML/M^2
BH CV ECHO MEAS - SI(TEICH): 17.9 ML/M^2
BH CV ECHO MEAS - SV(AO): 239.6 ML
BH CV ECHO MEAS - SV(CUBED): 41.3 ML
BH CV ECHO MEAS - SV(LVOT): 95 ML
BH CV ECHO MEAS - SV(TEICH): 39.2 ML
BH CV ECHO MEAS - TR MAX VEL: 276 CM/SEC
BILIRUB SERPL-MCNC: 0.5 MG/DL (ref 0.2–1.3)
BUN BLD-MCNC: 29 MG/DL (ref 7–21)
BUN/CREAT SERPL: 27.1 (ref 7–25)
CALCIUM SPEC-SCNC: 8.3 MG/DL (ref 8.4–10.2)
CHLORIDE SERPL-SCNC: 106 MMOL/L (ref 95–110)
CO2 SERPL-SCNC: 22 MMOL/L (ref 22–31)
CREAT BLD-MCNC: 1.07 MG/DL (ref 0.5–1)
DEPRECATED RDW RBC AUTO: 41.2 FL (ref 36.4–46.3)
EOSINOPHIL # BLD MANUAL: 0.42 10*3/MM3 (ref 0–0.7)
EOSINOPHIL NFR BLD MANUAL: 2 % (ref 0–7)
ERYTHROCYTE [DISTWIDTH] IN BLOOD BY AUTOMATED COUNT: 12.7 % (ref 11.5–14.5)
GFR SERPL CREATININE-BSD FRML MDRD: 50 ML/MIN/1.73 (ref 39–90)
GLOBULIN UR ELPH-MCNC: 3.3 GM/DL (ref 2.3–3.5)
GLUCOSE BLD-MCNC: 159 MG/DL (ref 60–100)
GLUCOSE BLDC GLUCOMTR-MCNC: 156 MG/DL (ref 70–130)
GLUCOSE BLDC GLUCOMTR-MCNC: 171 MG/DL (ref 70–130)
GLUCOSE BLDC GLUCOMTR-MCNC: 180 MG/DL (ref 70–130)
GLUCOSE BLDC GLUCOMTR-MCNC: 208 MG/DL (ref 70–130)
HCT VFR BLD AUTO: 33.6 % (ref 35–45)
HGB BLD-MCNC: 11.1 G/DL (ref 12–15.5)
LYMPHOCYTES # BLD MANUAL: 4.41 10*3/MM3 (ref 0.6–4.2)
LYMPHOCYTES NFR BLD MANUAL: 14 % (ref 0–12)
LYMPHOCYTES NFR BLD MANUAL: 21 % (ref 10–50)
MCH RBC QN AUTO: 29.4 PG (ref 26.5–34)
MCHC RBC AUTO-ENTMCNC: 33 G/DL (ref 31.4–36)
MCV RBC AUTO: 89.1 FL (ref 80–98)
MONOCYTES # BLD AUTO: 2.94 10*3/MM3 (ref 0–0.9)
NEUTROPHILS # BLD AUTO: 13.22 10*3/MM3 (ref 2–8.6)
NEUTROPHILS NFR BLD MANUAL: 61 % (ref 37–80)
NEUTS BAND NFR BLD MANUAL: 2 % (ref 0–5)
PLATELET # BLD AUTO: 128 10*3/MM3 (ref 150–450)
PMV BLD AUTO: 12.1 FL (ref 8–12)
POTASSIUM BLD-SCNC: 3.7 MMOL/L (ref 3.5–5.1)
PROT SERPL-MCNC: 5.7 G/DL (ref 6.3–8.6)
RBC # BLD AUTO: 3.77 10*6/MM3 (ref 3.77–5.16)
RBC MORPH BLD: NORMAL
SMALL PLATELETS BLD QL SMEAR: ABNORMAL
SODIUM BLD-SCNC: 136 MMOL/L (ref 137–145)
TOXIC GRANULATION: ABNORMAL
TROPONIN I SERPL-MCNC: 0.04 NG/ML
TROPONIN I SERPL-MCNC: 0.04 NG/ML
TROPONIN I SERPL-MCNC: 0.05 NG/ML
WBC NRBC COR # BLD: 20.99 10*3/MM3 (ref 3.2–9.8)

## 2018-05-04 PROCEDURE — 93005 ELECTROCARDIOGRAM TRACING: CPT | Performed by: FAMILY MEDICINE

## 2018-05-04 PROCEDURE — 63710000001 INSULIN DETEMIR PER 5 UNITS: Performed by: FAMILY MEDICINE

## 2018-05-04 PROCEDURE — 99232 SBSQ HOSP IP/OBS MODERATE 35: CPT | Performed by: FAMILY MEDICINE

## 2018-05-04 PROCEDURE — 85025 COMPLETE CBC W/AUTO DIFF WBC: CPT | Performed by: FAMILY MEDICINE

## 2018-05-04 PROCEDURE — 63710000001 INSULIN ASPART PER 5 UNITS: Performed by: INTERNAL MEDICINE

## 2018-05-04 PROCEDURE — 97535 SELF CARE MNGMENT TRAINING: CPT

## 2018-05-04 PROCEDURE — 74176 CT ABD & PELVIS W/O CONTRAST: CPT

## 2018-05-04 PROCEDURE — 25010000002 FUROSEMIDE PER 20 MG: Performed by: FAMILY MEDICINE

## 2018-05-04 PROCEDURE — 84484 ASSAY OF TROPONIN QUANT: CPT | Performed by: FAMILY MEDICINE

## 2018-05-04 PROCEDURE — 80053 COMPREHEN METABOLIC PANEL: CPT | Performed by: FAMILY MEDICINE

## 2018-05-04 PROCEDURE — 82962 GLUCOSE BLOOD TEST: CPT

## 2018-05-04 PROCEDURE — 93010 ELECTROCARDIOGRAM REPORT: CPT | Performed by: INTERNAL MEDICINE

## 2018-05-04 PROCEDURE — 93306 TTE W/DOPPLER COMPLETE: CPT

## 2018-05-04 PROCEDURE — 85007 BL SMEAR W/DIFF WBC COUNT: CPT | Performed by: FAMILY MEDICINE

## 2018-05-04 PROCEDURE — 71045 X-RAY EXAM CHEST 1 VIEW: CPT

## 2018-05-04 RX ORDER — ALPRAZOLAM 0.25 MG/1
0.25 TABLET ORAL NIGHTLY PRN
Status: DISCONTINUED | OUTPATIENT
Start: 2018-05-04 | End: 2018-05-11 | Stop reason: HOSPADM

## 2018-05-04 RX ORDER — DULOXETIN HYDROCHLORIDE 20 MG/1
20 CAPSULE, DELAYED RELEASE ORAL NIGHTLY
Status: DISCONTINUED | OUTPATIENT
Start: 2018-05-04 | End: 2018-05-10

## 2018-05-04 RX ORDER — FUROSEMIDE 10 MG/ML
40 INJECTION INTRAMUSCULAR; INTRAVENOUS ONCE
Status: COMPLETED | OUTPATIENT
Start: 2018-05-04 | End: 2018-05-04

## 2018-05-04 RX ORDER — LEVOFLOXACIN 250 MG/1
250 TABLET ORAL EVERY 24 HOURS
Status: DISCONTINUED | OUTPATIENT
Start: 2018-05-04 | End: 2018-05-05

## 2018-05-04 RX ORDER — ENALAPRILAT 2.5 MG/2ML
1.25 INJECTION INTRAVENOUS EVERY 6 HOURS PRN
Status: DISCONTINUED | OUTPATIENT
Start: 2018-05-04 | End: 2018-05-11 | Stop reason: HOSPADM

## 2018-05-04 RX ORDER — LISINOPRIL 10 MG/1
10 TABLET ORAL
Status: DISCONTINUED | OUTPATIENT
Start: 2018-05-04 | End: 2018-05-11 | Stop reason: HOSPADM

## 2018-05-04 RX ADMIN — FUROSEMIDE 40 MG: 10 INJECTION, SOLUTION INTRAMUSCULAR; INTRAVENOUS at 11:50

## 2018-05-04 RX ADMIN — NYSTATIN: 100000 POWDER TOPICAL at 20:27

## 2018-05-04 RX ADMIN — HYDROCORTISONE: 1 CREAM TOPICAL at 20:27

## 2018-05-04 RX ADMIN — DULOXETINE HYDROCHLORIDE 20 MG: 20 CAPSULE, DELAYED RELEASE ORAL at 20:27

## 2018-05-04 RX ADMIN — INSULIN DETEMIR 12 UNITS: 100 INJECTION, SOLUTION SUBCUTANEOUS at 20:29

## 2018-05-04 RX ADMIN — LEVOFLOXACIN 250 MG: 250 TABLET, FILM COATED ORAL at 18:01

## 2018-05-04 RX ADMIN — INSULIN ASPART 5 UNITS: 100 INJECTION, SOLUTION INTRAVENOUS; SUBCUTANEOUS at 11:55

## 2018-05-04 RX ADMIN — INSULIN ASPART 3 UNITS: 100 INJECTION, SOLUTION INTRAVENOUS; SUBCUTANEOUS at 08:37

## 2018-05-04 RX ADMIN — INSULIN ASPART 3 UNITS: 100 INJECTION, SOLUTION INTRAVENOUS; SUBCUTANEOUS at 20:53

## 2018-05-04 RX ADMIN — ENALAPRILAT 1.25 MG: 1.25 INJECTION INTRAVENOUS at 11:55

## 2018-05-04 RX ADMIN — LISINOPRIL 10 MG: 10 TABLET ORAL at 12:04

## 2018-05-04 RX ADMIN — NYSTATIN: 100000 POWDER TOPICAL at 08:36

## 2018-05-04 RX ADMIN — POTASSIUM CHLORIDE, DEXTROSE MONOHYDRATE AND SODIUM CHLORIDE 100 ML/HR: 150; 5; 450 INJECTION, SOLUTION INTRAVENOUS at 06:09

## 2018-05-04 RX ADMIN — INSULIN ASPART 3 UNITS: 100 INJECTION, SOLUTION INTRAVENOUS; SUBCUTANEOUS at 18:01

## 2018-05-04 RX ADMIN — HYDROCORTISONE: 1 CREAM TOPICAL at 08:35

## 2018-05-04 NOTE — PROGRESS NOTES
FAMILY MEDICINE PROGRESS NOTE  NAME: Tessy Gonzalez  : 1940  MRN: [id number]     LOS: 4 days   Conditional Code  PROVIDER OF SERVICE:     Chief Complaint:  Sepsis due to Klebsiella pneumoniae    Subjective     Interval History:  History taken from: patient chart RN  Subjective: Patient is a 76 yo  female who was admitted with septic shock.  She is eating better.  She declined physical therapy.    Review of Systems:   Review of Systems   Unable to perform ROS: Mental status change       Objective     Vital Signs  Temp:  [97.8 °F (36.6 °C)-99.7 °F (37.6 °C)] 99.7 °F (37.6 °C)  Heart Rate:  [69-83] 71  Resp:  [18] 18  BP: (140-157)/(58-76) 140/76    Physical Exam  Physical Exam   Constitutional: She appears well-developed and well-nourished. No distress.   Appears to be ill   Cardiovascular: Normal rate, regular rhythm, normal heart sounds and intact distal pulses.  Exam reveals no gallop and no friction rub.    No murmur heard.  Pulmonary/Chest: No respiratory distress. She has no wheezes. She has no rales.   Diminished breath sounds in the bases, poor inspiratory effort   Abdominal: Soft. Bowel sounds are normal. She exhibits no distension. There is no tenderness. There is no rebound and no guarding.   Musculoskeletal: She exhibits deformity.   Right lower leg lateral aspect large groove   Neurological:   oriented to place and person only, more awake   Skin: She is not diaphoretic.   Nursing note and vitals reviewed.      Medication Review    Current Facility-Administered Medications:   •  acetaminophen (TYLENOL) tablet 650 mg, 650 mg, Oral, Q6H PRN, Jason Beckman MD  •  dextrose (D50W) solution 25 g, 25 g, Intravenous, Q15 Min PRN, Jason Beckman MD, 25 g at 18 0503  •  dextrose (GLUTOSE) oral gel 15 g, 15 g, Oral, Q15 Min PRN, Jason Beckman MD  •  dextrose 5 % and sodium chloride 0.45 % with KCl 20 mEq/L infusion, 100 mL/hr, Intravenous, Continuous, Glo Kirk MD, Last Rate: 100 mL/hr  at 05/03/18 1552, 100 mL/hr at 05/03/18 1552  •  glucagon (human recombinant) (GLUCAGEN DIAGNOSTIC) injection 1 mg, 1 mg, Subcutaneous, PRN, Jason Beckman MD  •  insulin aspart (novoLOG) injection 0-14 Units, 0-14 Units, Subcutaneous, 4x Daily AC & at Bedtime, Jason Beckman MD, 5 Units at 05/03/18 2109  •  insulin detemir (LEVEMIR) injection 10 Units, 10 Units, Subcutaneous, Nightly, Glo Kirk MD, 10 Units at 05/03/18 2109  •  levoFLOXacin (LEVAQUIN) 250 mg/50 mL D5W (premix) 250 mg, 250 mg, Intravenous, Q24H, Kulwinder Hernandez MD, 250 mg at 05/03/18 1552  •  magic butt ointment, , Topical, BID, Kulwinder Hernandez MD  •  morphine injection 1 mg, 1 mg, Intravenous, Q4H PRN, 1 mg at 05/03/18 1734 **AND** naloxone (NARCAN) injection 0.4 mg, 0.4 mg, Intravenous, Q5 Min PRN, Jason Beckman MD  •  nystatin (MYCOSTATIN) powder, , Topical, Q12H, Jason Beckman MD  •  Pharmacy to Dose LevoFLOXacin (LEVAQUIN), , Does not apply, Continuous PRN, Kulwinder Hernandez MD  •  sodium chloride 0.9 % flush 1-10 mL, 1-10 mL, Intravenous, PRN, Jason Beckman MD     Diagnostic Data      I reviewed the patient's new clinical results and imaging.      Assessment/Plan     Active Hospital Problems (** Indicates Principal Problem)    Diagnosis Date Noted   • **Sepsis due to Klebsiella pneumoniae [A41.4] 04/29/2018   • Acute on chronic renal failure [N17.9, N18.9] 04/30/2018   • Bacteremia due to Klebsiella pneumoniae [R78.81] 04/30/2018   • UTI (urinary tract infection) [N39.0] 04/29/2018   • Long-term insulin use in type 2 diabetes [E11.9, Z79.4] 04/29/2018   • Morbid obesity [E66.01]       Resolved Hospital Problems    Diagnosis Date Noted Date Resolved   • Septic shock [A41.9, R65.21] 04/29/2018 05/01/2018     Get nail clippers from office.  Continue antibiotics and fluids.  Get records from [facility] from November to see what her baseline kidney function runs.    DVT prophylaxis: SCDs/TEDs      Disposition:uncertain at this point          This  document has been electronically signed by Glo Kirk MD on May 3, 2018 9:43 PM

## 2018-05-04 NOTE — PROGRESS NOTES
FAMILY MEDICINE PROGRESS NOTE  NAME: Tessy Gonzalez  : 1940  MRN: [id number]     LOS: 5 days   Conditional Code  PROVIDER OF SERVICE:     Chief Complaint:  Sepsis due to Klebsiella pneumoniae    Subjective     Interval History:  History taken from: patient chart RN  Subjective: Patient is a 76 yo  female who was admitted with septic shock.  She has days when she cannot sleep.  She will hallucinate.  She screamed loud this am when they roll her.  She has not been out of bed for almost 2 years.    Review of Systems:   Review of Systems   Unable to perform ROS: Mental status change       Objective     Vital Signs  Temp:  [97 °F (36.1 °C)-99.7 °F (37.6 °C)] 97 °F (36.1 °C)  Heart Rate:  [64-73] 66  Resp:  [18-20] 18  BP: (138-183)/(56-77) 181/77    Physical Exam  Physical Exam   Constitutional: She appears well-developed and well-nourished. No distress.   Appears to be ill   Cardiovascular: Normal rate, regular rhythm, normal heart sounds and intact distal pulses.  Exam reveals no gallop and no friction rub.    No murmur heard.  Pulmonary/Chest: No respiratory distress. She has no wheezes. She has no rales.   Diminished breath sounds in the bases, poor inspiratory effort   Abdominal: Soft. Bowel sounds are normal. She exhibits no distension. There is tenderness. There is no rebound and no guarding.   Tender left lower quadrant, patient grimaces   Musculoskeletal: She exhibits deformity.   Right lower leg lateral aspect large groove   Neurological:   lethargic   Skin: She is not diaphoretic.   Nursing note and vitals reviewed.      Medication Review    Current Facility-Administered Medications:   •  acetaminophen (TYLENOL) tablet 650 mg, 650 mg, Oral, Q6H PRN, Jason Beckman MD  •  ALPRAZolam (XANAX) tablet 0.25 mg, 0.25 mg, Oral, Nightly PRN, Glo Kirk MD  •  dextrose (D50W) solution 25 g, 25 g, Intravenous, Q15 Min PRN, Jason Beckman MD, 25 g at 18 0503  •  dextrose (GLUTOSE) oral gel 15 g,  15 g, Oral, Q15 Min PRN, Jason Beckman MD  •  DULoxetine (CYMBALTA) DR capsule 20 mg, 20 mg, Oral, Nightly, Glo Kirk MD  •  enalaprilat (VASOTEC) injection 1.25 mg, 1.25 mg, Intravenous, Q6H PRN, Glo Kirk MD  •  furosemide (LASIX) injection 40 mg, 40 mg, Intravenous, Once, Glo Kirk MD  •  glucagon (human recombinant) (GLUCAGEN DIAGNOSTIC) injection 1 mg, 1 mg, Subcutaneous, PRN, Jason Beckman MD  •  insulin aspart (novoLOG) injection 0-14 Units, 0-14 Units, Subcutaneous, 4x Daily AC & at Bedtime, Jason Beckman MD, 3 Units at 05/04/18 0837  •  insulin detemir (LEVEMIR) injection 12 Units, 12 Units, Subcutaneous, Nightly, Glo Kirk MD  •  levoFLOXacin (LEVAQUIN) tablet 250 mg, 250 mg, Oral, Q24H, Glo Kirk MD  •  lisinopril (PRINIVIL,ZESTRIL) tablet 10 mg, 10 mg, Oral, Q24H, Glo Kirk MD  •  magic butt ointment, , Topical, BID, Kulwinder Hernandez MD  •  morphine injection 1 mg, 1 mg, Intravenous, Q4H PRN, 1 mg at 05/03/18 1734 **AND** naloxone (NARCAN) injection 0.4 mg, 0.4 mg, Intravenous, Q5 Min PRN, Jason Beckman MD  •  nystatin (MYCOSTATIN) powder, , Topical, Q12H, Jason Beckman MD  •  Pharmacy to Dose LevoFLOXacin (LEVAQUIN), , Does not apply, Continuous PRN, Kulwinder Hernandez MD  •  sodium chloride 0.9 % flush 1-10 mL, 1-10 mL, Intravenous, PRN, Jason Beckman MD     Diagnostic Data      I reviewed the patient's new clinical results and imaging.      Assessment/Plan     Active Hospital Problems (** Indicates Principal Problem)    Diagnosis Date Noted   • **Sepsis due to Klebsiella pneumoniae [A41.4] 04/29/2018   • Angina at rest [I20.8] 05/04/2018   • Dementia with behavioral disturbance [F03.91] 05/04/2018   • Left lower quadrant pain [R10.32] 05/04/2018   • Generalized anxiety disorder [F41.1] 05/04/2018   • Chronic pain syndrome [G89.4] 05/04/2018   • Acute on chronic renal failure [N17.9, N18.9] 04/30/2018   • Bacteremia due to Klebsiella pneumoniae [R78.81] 04/30/2018   • UTI  (urinary tract infection) [N39.0] 04/29/2018   • Long-term insulin use in type 2 diabetes [E11.9, Z79.4] 04/29/2018   • Hypertension [I10] 04/29/2018   • Morbid obesity [E66.01]       Resolved Hospital Problems    Diagnosis Date Noted Date Resolved   • Septic shock [A41.9, R65.21] 04/29/2018 05/01/2018     Trend troponins for the chest pain and order echo for chest pain and pulmonary congestion.  Stop the IVF and one dose of Lasix.  Lisinopril for hypertension.  Cymbalta for anxiety and may help with pain as well.  Xanax as needed at night for sleep.  Family is planning on taking her home with home health.  CT abdomen and pelvis for persistent leukocytosis and left lower abdominal pain.  Consult case management discharge planning.    DVT prophylaxis: SCDs/TEDs      Disposition:uncertain at this point          This document has been electronically signed by Glo Kirk MD on May 4, 2018 11:50 AM

## 2018-05-04 NOTE — PLAN OF CARE
Problem: Patient Care Overview  Goal: Plan of Care Review  Outcome: Ongoing (interventions implemented as appropriate)    Goal: Individualization and Mutuality  Outcome: Ongoing (interventions implemented as appropriate)    Goal: Discharge Needs Assessment  Outcome: Ongoing (interventions implemented as appropriate)      Problem: Fall Risk (Adult)  Goal: Absence of Fall  Outcome: Ongoing (interventions implemented as appropriate)      Problem: Skin Injury Risk (Adult)  Goal: Skin Health and Integrity  Outcome: Ongoing (interventions implemented as appropriate)      Problem: Urinary Tract Infection (Adult)  Goal: Signs and Symptoms of Listed Potential Problems Will be Absent, Minimized or Managed (Urinary Tract Infection)  Outcome: Ongoing (interventions implemented as appropriate)      Problem: Pain, Chronic (Adult)  Goal: Acceptable Pain/Comfort Level and Functional Ability  Outcome: Ongoing (interventions implemented as appropriate)      Problem: Sepsis/Septic Shock (Adult)  Goal: Signs and Symptoms of Listed Potential Problems Will be Absent, Minimized or Managed (Sepsis/Septic Shock)  Outcome: Outcome(s) achieved Date Met: 05/04/18

## 2018-05-04 NOTE — PLAN OF CARE
Problem: Patient Care Overview  Goal: Plan of Care Review  Outcome: Ongoing (interventions implemented as appropriate)   05/04/18 0111   Coping/Psychosocial   Plan of Care Reviewed With patient   Plan of Care Review   Progress improving   OTHER   Outcome Summary Pt tolerated tx fair this date. Pt needed set up to wash face/hands. Pt declined to do any other ADLs or ther ex. Continue POC.

## 2018-05-04 NOTE — PLAN OF CARE
Problem: Patient Care Overview  Goal: Plan of Care Review  Outcome: Ongoing (interventions implemented as appropriate)   05/04/18 0752   Coping/Psychosocial   Plan of Care Reviewed With patient;family   Plan of Care Review   Progress no change   OTHER   Outcome Summary pt complained of chest pain this morning. waiting for MD to respond.     Goal: Individualization and Mutuality  Outcome: Ongoing (interventions implemented as appropriate)    Goal: Discharge Needs Assessment  Outcome: Ongoing (interventions implemented as appropriate)    Goal: Interprofessional Rounds/Family Conf  Outcome: Ongoing (interventions implemented as appropriate)      Problem: Fall Risk (Adult)  Goal: Absence of Fall  Outcome: Ongoing (interventions implemented as appropriate)      Problem: Skin Injury Risk (Adult)  Goal: Skin Health and Integrity  Outcome: Ongoing (interventions implemented as appropriate)      Problem: Urinary Tract Infection (Adult)  Goal: Signs and Symptoms of Listed Potential Problems Will be Absent, Minimized or Managed (Urinary Tract Infection)  Outcome: Ongoing (interventions implemented as appropriate)      Problem: Pain, Chronic (Adult)  Goal: Acceptable Pain/Comfort Level and Functional Ability  Outcome: Ongoing (interventions implemented as appropriate)

## 2018-05-04 NOTE — THERAPY TREATMENT NOTE
Acute Care - Occupational Therapy Treatment Note  [facility]     Patient Name: Tessy Gonzalez  : 1940  MRN: [id number]  Today's Date: 2018  Onset of Illness/Injury or Date of Surgery: 18  Date of Referral to OT: 18  Referring Physician: Dr. Glo Kirk    Admit Date: 2018       ICD-10-CM ICD-9-CM   1. Impaired mobility and ADLs Z74.09 799.89   2. Impaired physical mobility Z74.09 781.99     Patient Active Problem List   Diagnosis   • Sepsis due to Klebsiella pneumoniae   • UTI (urinary tract infection)   • Hypertension   • Long-term insulin use in type 2 diabetes   • CKD (chronic kidney disease) stage 3, GFR 30-59 ml/min   • Morbid obesity   • Acute on chronic renal failure   • Bacteremia due to Klebsiella pneumoniae   • Angina at rest   • Dementia with behavioral disturbance   • Left lower quadrant pain   • Generalized anxiety disorder   • Chronic pain syndrome     Past Medical History:   Diagnosis Date   • Atherosclerosis    • Basal cell carcinoma    • Basal cell carcinoma of medial canthus, left    • Basal cell carcinoma of nose 2000    right lateral    • Bilateral leg edema    • Cellulitis    • CKD (chronic kidney disease) stage 3, GFR 30-59 ml/min    • Degenerative joint disease involving multiple joints    • Dementia    • Depression    • Diabetes mellitus    • Diabetic nephropathy    • Diabetic neuropathy    • Diastolic dysfunction    • Foot abscess    • GERD (gastroesophageal reflux disease)    • Hypertension    • Ischemic stroke    • Morbid obesity    • Peripheral venous insufficiency    • Recurrent UTI (urinary tract infection)      Past Surgical History:   Procedure Laterality Date   • BIOPSY SKIN / SQ / MUCOUS MEMBRANE     • HIP ARTHROPLASTY Right    • SKIN GRAFT      taken from the neck for the face   • VEIN LIGATION AND STRIPPING BILATERAL         Therapy Treatment          Rehabilitation Treatment Summary     Row Name 18 1048             Treatment  Time/Intention    Discipline occupational therapy assistant  -CS      Document Type therapy note (daily note)  -CS      Subjective Information no complaints  -CS      Mode of Treatment occupational therapy  -CS      Patient Effort poor  -CS2      Recorded by [CS] TORI VillatoroA/FATUMA 05/04/18 1051 05/04/18 1051  [CS2] TORI VillatoroA/L 05/04/18 1209 05/04/18 1209      Row Name 05/04/18 1048             Vital Signs    Pre Systolic BP Rehab 181  -CS      Pre Treatment Diastolic BP 77  -CS      Pretreatment Heart Rate (beats/min) 66  -CS      Pre SpO2 (%) 97  -CS      O2 Delivery Pre Treatment supplemental O2  -CS      Pre Patient Position Supine  -CS      Post Patient Position Supine  -CS2      Recorded by [CS] TORI VillatoroA/FATUMA 05/04/18 1052 05/04/18 1052  [CS2] TORI VillatoroA/L 05/04/18 1209 05/04/18 1209      Row Name 05/04/18 1048             Grooming Assessment/Training    [place] Level (Grooming) wash face, hands;set up  -CS      Grooming Position supine  -CS      Recorded by [CS] TORI VillatoroA/FATUMA 05/04/18 1209 05/04/18 1209      Row Name 05/04/18 1048             Positioning and Restraints    Pre-Treatment Position in bed  -CS      Post Treatment Position bed  -CS2      In Bed supine;call light within reach  -CS2      Recorded by [CS] TORI VillatoroA/L 05/04/18 1052 05/04/18 1052  [CS2] TORI VillatoroA/FATUMA 05/04/18 1209 05/04/18 1209      Row Name 05/04/18 1048             Pain Scale: Numbers Pre/Post-Treatment    Pain Scale: Numbers, Pretreatment 0/10 - no pain  -CS      Pain Scale: Numbers, Post-Treatment 0/10 - no pain  -CS2      Recorded by [CS] TORI VillatoroA/FATUMA 05/04/18 1052 05/04/18 1052  [CS2] TORI VillatoroA/L 05/04/18 1209 05/04/18 1209      Row Name                Wound 05/01/18 1700 Right      Wound - Properties Group Date first assessed: 05/01/18 [MM] Time first assessed: 1700 [MM] Present On Admission : no [MM] Side: Right [MM]  Recorded by:  [MM] Celeste Plata RN 05/01/18 1840 05/01/18 1840    Row Name                Wound 05/01/18 1700 Bilateral      Wound - Properties Group Date first assessed: 05/01/18 [MM] Time first assessed: 1700 [MM] Present On Admission : no [MM] Side: Bilateral [MM] Recorded by:  [MM] Celeste Plata RN 05/01/18 1841 05/01/18 1841    Row Name                Wound 05/02/18 0700 Left  abrasion    Wound - Properties Group Date first assessed: 05/02/18 [MM] Time first assessed: 0700 [MM] Present On Admission : no [MM] Side: Left [MM] Type: abrasion [MM] Recorded by:  [MM] Celeste Plata RN 05/02/18 1933 05/02/18 1933    Row Name 05/04/18 1048             Outcome Summary/Treatment Plan (OT)    Daily Summary of Progress (OT) progress towards functional goals is fair  -CS      Plan for Continued Treatment (OT) cont OT POC  -CS      Anticipated Discharge Disposition (OT) skilled nursing facility (SNF);home with home health   24/7 care  -CS      Recorded by [CS] ODALIS Villatoro/FATUMA 05/04/18 1209 05/04/18 1209        User Key  (r) = Recorded By, (t) = Taken By, (c) = Cosigned By    Initials Name Effective Dates Discipline    MM Celeste Plata RN 10/17/16 -  Nurse    CS ODALIS Villatoro/FATUMA 03/07/18 -  OT        Wound 05/01/18 1700 Right   (Active)   Dressing Appearance open to air 5/4/2018  7:46 AM   Base pink 5/4/2018  7:46 AM   Drainage Amount none 5/4/2018  7:46 AM   Care, Wound barrier applied 5/4/2018  7:46 AM   Periwound Care, Wound topical treatment applied 5/3/2018  9:12 PM       Wound 05/01/18 1700 Bilateral   (Active)   Dressing Appearance open to air 5/4/2018  7:46 AM   Base other (see comments) 5/4/2018  7:46 AM   Periwound intact;blanchable;excoriated 5/4/2018  7:46 AM   Drainage Amount none 5/4/2018  7:46 AM   Care, Wound barrier applied 5/4/2018  7:46 AM   Periwound Care, Wound barrier ointment applied 5/3/2018  9:12 PM       Wound 05/02/18 0700 Left  abrasion (Active)   Dressing  Appearance open to air 5/4/2018  7:46 AM   Base red/granulating 5/4/2018  7:46 AM             OT Rehab Goals     Row Name 05/04/18 1048             Grooming Goal 1 (OT)    Activity/Device (Grooming Goal 1, OT) oral care;wash face, hands  -CS      [place] (Grooming Goal 1, OT) set-up required;tactile cues required;verbal cues required;minimum assist (75% or more patient effort)  -CS      Time Frame (Grooming Goal 1, OT) long term goal (LTG);by discharge  -CS      Progress/Outcome (Grooming Goal 1, OT) goal partially met;continuing progress toward goal  -CS         Self-Feeding Goal 1 (OT)    Activity/Assistive Device (Self-Feeding Goal 1, OT) self-feeding skills, all  -CS      [place] Level/Cues Needed (Self-Feeding Goal 1, OT) set-up required;supervision required;tactile cues required;verbal cues required  -CS      Time Frame (Self-Feeding Goal 1, OT) long term goal (LTG);by discharge  -CS      Progress/Outcomes (Self-Feeding Goal 1, OT) goal not met;continuing progress toward goal  -CS        User Key  (r) = Recorded By, (t) = Taken By, (c) = Cosigned By    Initials Name Provider Type Discipline    ANNALISE Bean Occupational Therapy Assistant OT        Occupational Therapy Education     Title: PT OT SLP Therapies (Done)     Topic: Occupational Therapy (Done)     Point: ADL training (Done)     Description: Instruct learner(s) on proper safety adaptation and remediation techniques during self care or transfers.   Instruct in proper use of assistive devices.   Learning Progress Summary     Learner Status Readiness Method Response Comment Documented by    Patient Done Acceptance E,TB,D MARII,NERISSA   05/04/18 1253     Done Acceptance E NERISSA COLVIN Educated about OT and POC. Educated to call for assist.  05/02/18 1049    Family Done Acceptance E NERISSA COLVIN Educated about OT and POC. Educated to call for assist.  05/02/18 1049          Point: Home exercise program (Done)     Description: Instruct learner(s)  on appropriate technique for monitoring, assisting and/or progressing therapeutic exercises/activities.   Learning Progress Summary     Learner Status Readiness Method Response Comment Documented by    Patient Done Acceptance E,KHUSHBU GAINES,NR   05/04/18 1253     Active Acceptance E NR   05/03/18 1531          Point: Precautions (Done)     Description: Instruct learner(s) on prescribed precautions during self-care and functional transfers.   Learning Progress Summary     Learner Status Readiness Method Response Comment Documented by    Patient Done Acceptance E,KHUSHBU GAINES,NR   05/04/18 1253          Point: Body mechanics (Done)     Description: Instruct learner(s) on proper positioning and spine alignment during self-care, functional mobility activities and/or exercises.   Learning Progress Summary     Learner Status Readiness Method Response Comment Documented by    Patient Done Acceptance E,KHUSHBU GAINES,NR   05/04/18 1253                      User Key     Initials Effective Dates Name Provider Type Discipline     10/17/16 -  Rea Robbins OTR/L Occupational Therapist OT     03/07/18 -  Shreya Vazquez JACOBO/L Occupational Therapy Assistant OT     03/07/18 -  TORI VillatoroA/FATUMA Occupational Therapy Assistant OT                OT Recommendation and Plan  Outcome Summary/Treatment Plan (OT)  Daily Summary of Progress (OT): progress towards functional goals is fair  Plan for Continued Treatment (OT): cont OT POC  Anticipated Discharge Disposition (OT): skilled nursing facility (SNF), home with home health (24/7 care)  Daily Summary of Progress (OT): progress towards functional goals is fair  Plan of Care Review  Plan of Care Reviewed With: patient  Plan of Care Reviewed With: patient  Outcome Summary: Pt tolerated tx fair this date. Pt needed set up to wash face/hands. Pt declined to do any other ADLs or ther ex. Continue POC.        Outcome Measures     Row Name 05/04/18 1200 05/03/18 1424 05/02/18 5720        How much help from another is currently needed...    Putting on and taking off regular lower body clothing? 1  -CS 1  -BB 1  -BH    Bathing (including washing, rinsing, and drying) 1  -CS 1  -BB 1  -BH    Toileting (which includes using toilet bed pan or urinal) 1  -CS 1  -BB 1  -BH    Putting on and taking off regular upper body clothing 1  -CS 1  -BB 1  -BH    Taking care of personal grooming (such as brushing teeth) 2  -CS 2  -BB 2  -BH    Eating meals 2  -CS 2  -BB 2  -BH    Score 8  -CS 8  -BB 8  -BH       Functional Assessment    Outcome Measure Options AM-PAC 6 Clicks Daily Activity (OT)  -CS  -- AM-PAC 6 Clicks Daily Activity (OT)  -    Row Name 05/02/18 0822             How much help from another person do you currently need...    Turning from your back to your side while in flat bed without using bedrails? 2  -CZ      Moving from lying on back to sitting on the side of a flat bed without bedrails? 2  -CZ      Moving to and from a bed to a chair (including a wheelchair)? 1  -CZ      Standing up from a chair using your arms (e.g., wheelchair, bedside chair)? 1  -CZ      Climbing 3-5 steps with a railing? 1  -CZ      To walk in hospital room? 1  -CZ      AM-PAC 6 Clicks Score 8  -CZ         Functional Assessment    Outcome Measure Options AM-PAC 6 Clicks Basic Mobility (PT)  -CZ        User Key  (r) = Recorded By, (t) = Taken By, (c) = Cosigned By    Initials Name Provider Type     Rea Robbins, OTTED/L Occupational Therapist    BB Shreya Vazquez JACOBO/L Occupational Therapy Assistant    TORI BeanA/L Occupational Therapy Assistant    CZ Geronimo Rivers, PT Physical Therapist           Time Calculation:         Time Calculation- OT     Row Name 05/04/18 1259             Time Calculation- OT    OT Start Time 1048  -CS      OT Stop Time 1102  -CS      OT Time Calculation (min) 14 min  -CS      Total Timed Code Minutes- OT 14 minute(s)  -CS      OT Received On 05/04/18  -         User Key  (r) = Recorded By, (t) = Taken By, (c) = Cosigned By    Initials Name Provider Type     ODALIS Villatoro/FATUMA Occupational Therapy Assistant           Therapy Charges for Today     Code Description Service Date Service Provider Modifiers Qty    [id number] HC OT SELF CARE/MGMT/TRAIN EA 15 MIN 5/4/2018 ANNALISE Villatoro GO 1          OT G-codes  OT Professional Judgement Used?: Yes  OT Functional Scales Options: AM-PAC 6 Clicks Daily Activity (OT)  Score: 8  Functional Limitation: Self care  Self Care Current Status (): At least 80 percent but less than 100 percent impaired, limited or restricted  Self Care Goal Status (): At least 60 percent but less than 80 percent impaired, limited or restricted    ANNALISE Villatoro  5/4/2018

## 2018-05-05 PROBLEM — K62.89 PROCTITIS: Status: ACTIVE | Noted: 2018-05-05

## 2018-05-05 LAB
ALBUMIN SERPL-MCNC: 2.6 G/DL (ref 3.4–4.8)
ALBUMIN/GLOB SERPL: 0.8 G/DL (ref 1.1–1.8)
ALP SERPL-CCNC: 104 U/L (ref 38–126)
ALT SERPL W P-5'-P-CCNC: 28 U/L (ref 9–52)
ANION GAP SERPL CALCULATED.3IONS-SCNC: 7 MMOL/L (ref 5–15)
AST SERPL-CCNC: 17 U/L (ref 14–36)
BASOPHILS # BLD AUTO: 0.13 10*3/MM3 (ref 0–0.2)
BASOPHILS NFR BLD AUTO: 0.6 % (ref 0–2)
BILIRUB SERPL-MCNC: 0.6 MG/DL (ref 0.2–1.3)
BUN BLD-MCNC: 27 MG/DL (ref 7–21)
BUN/CREAT SERPL: 25.2 (ref 7–25)
CALCIUM SPEC-SCNC: 8.5 MG/DL (ref 8.4–10.2)
CHLORIDE SERPL-SCNC: 104 MMOL/L (ref 95–110)
CO2 SERPL-SCNC: 27 MMOL/L (ref 22–31)
CREAT BLD-MCNC: 1.07 MG/DL (ref 0.5–1)
DEPRECATED RDW RBC AUTO: 40.5 FL (ref 36.4–46.3)
EOSINOPHIL # BLD AUTO: 0.61 10*3/MM3 (ref 0–0.7)
EOSINOPHIL NFR BLD AUTO: 3 % (ref 0–7)
ERYTHROCYTE [DISTWIDTH] IN BLOOD BY AUTOMATED COUNT: 12.6 % (ref 11.5–14.5)
GFR SERPL CREATININE-BSD FRML MDRD: 50 ML/MIN/1.73 (ref 39–90)
GLOBULIN UR ELPH-MCNC: 3.3 GM/DL (ref 2.3–3.5)
GLUCOSE BLD-MCNC: 83 MG/DL (ref 60–100)
GLUCOSE BLDC GLUCOMTR-MCNC: 161 MG/DL (ref 70–130)
GLUCOSE BLDC GLUCOMTR-MCNC: 172 MG/DL (ref 70–130)
GLUCOSE BLDC GLUCOMTR-MCNC: 195 MG/DL (ref 70–130)
GLUCOSE BLDC GLUCOMTR-MCNC: 79 MG/DL (ref 70–130)
HCT VFR BLD AUTO: 33.4 % (ref 35–45)
HGB BLD-MCNC: 11.3 G/DL (ref 12–15.5)
IMM GRANULOCYTES # BLD: 1.14 10*3/MM3 (ref 0–0.02)
IMM GRANULOCYTES NFR BLD: 5.6 % (ref 0–0.5)
LYMPHOCYTES # BLD AUTO: 3.92 10*3/MM3 (ref 0.6–4.2)
LYMPHOCYTES NFR BLD AUTO: 19.3 % (ref 10–50)
MCH RBC QN AUTO: 30.1 PG (ref 26.5–34)
MCHC RBC AUTO-ENTMCNC: 33.8 G/DL (ref 31.4–36)
MCV RBC AUTO: 89.1 FL (ref 80–98)
MONOCYTES # BLD AUTO: 2.57 10*3/MM3 (ref 0–0.9)
MONOCYTES NFR BLD AUTO: 12.7 % (ref 0–12)
NEUTROPHILS # BLD AUTO: 11.89 10*3/MM3 (ref 2–8.6)
NEUTROPHILS NFR BLD AUTO: 58.8 % (ref 37–80)
PLATELET # BLD AUTO: 154 10*3/MM3 (ref 150–450)
PMV BLD AUTO: 11.3 FL (ref 8–12)
POTASSIUM BLD-SCNC: 3.5 MMOL/L (ref 3.5–5.1)
PROT SERPL-MCNC: 5.9 G/DL (ref 6.3–8.6)
RBC # BLD AUTO: 3.75 10*6/MM3 (ref 3.77–5.16)
RBC MORPH BLD: NORMAL
SMALL PLATELETS BLD QL SMEAR: ADEQUATE
SODIUM BLD-SCNC: 138 MMOL/L (ref 137–145)
WBC MORPH BLD: NORMAL
WBC NRBC COR # BLD: 20.26 10*3/MM3 (ref 3.2–9.8)

## 2018-05-05 PROCEDURE — 85025 COMPLETE CBC W/AUTO DIFF WBC: CPT | Performed by: FAMILY MEDICINE

## 2018-05-05 PROCEDURE — 94799 UNLISTED PULMONARY SVC/PX: CPT

## 2018-05-05 PROCEDURE — 85007 BL SMEAR W/DIFF WBC COUNT: CPT | Performed by: FAMILY MEDICINE

## 2018-05-05 PROCEDURE — 99232 SBSQ HOSP IP/OBS MODERATE 35: CPT | Performed by: FAMILY MEDICINE

## 2018-05-05 PROCEDURE — 63710000001 INSULIN DETEMIR PER 5 UNITS: Performed by: FAMILY MEDICINE

## 2018-05-05 PROCEDURE — 94640 AIRWAY INHALATION TREATMENT: CPT

## 2018-05-05 PROCEDURE — 82962 GLUCOSE BLOOD TEST: CPT

## 2018-05-05 PROCEDURE — 80053 COMPREHEN METABOLIC PANEL: CPT | Performed by: FAMILY MEDICINE

## 2018-05-05 PROCEDURE — 94760 N-INVAS EAR/PLS OXIMETRY 1: CPT

## 2018-05-05 PROCEDURE — 63710000001 INSULIN ASPART PER 5 UNITS: Performed by: INTERNAL MEDICINE

## 2018-05-05 RX ORDER — AMOXICILLIN AND CLAVULANATE POTASSIUM 500; 125 MG/1; MG/1
1 TABLET, FILM COATED ORAL EVERY 12 HOURS SCHEDULED
Status: DISCONTINUED | OUTPATIENT
Start: 2018-05-05 | End: 2018-05-08

## 2018-05-05 RX ORDER — IPRATROPIUM BROMIDE AND ALBUTEROL SULFATE 2.5; .5 MG/3ML; MG/3ML
3 SOLUTION RESPIRATORY (INHALATION)
Status: DISCONTINUED | OUTPATIENT
Start: 2018-05-05 | End: 2018-05-11 | Stop reason: HOSPADM

## 2018-05-05 RX ADMIN — NYSTATIN: 100000 POWDER TOPICAL at 21:16

## 2018-05-05 RX ADMIN — INSULIN ASPART 3 UNITS: 100 INJECTION, SOLUTION INTRAVENOUS; SUBCUTANEOUS at 11:19

## 2018-05-05 RX ADMIN — IPRATROPIUM BROMIDE AND ALBUTEROL SULFATE 3 ML: .5; 3 SOLUTION RESPIRATORY (INHALATION) at 19:10

## 2018-05-05 RX ADMIN — INSULIN DETEMIR 12 UNITS: 100 INJECTION, SOLUTION SUBCUTANEOUS at 21:17

## 2018-05-05 RX ADMIN — LISINOPRIL 10 MG: 10 TABLET ORAL at 09:36

## 2018-05-05 RX ADMIN — IPRATROPIUM BROMIDE AND ALBUTEROL SULFATE 3 ML: .5; 3 SOLUTION RESPIRATORY (INHALATION) at 14:40

## 2018-05-05 RX ADMIN — INSULIN ASPART 3 UNITS: 100 INJECTION, SOLUTION INTRAVENOUS; SUBCUTANEOUS at 17:59

## 2018-05-05 RX ADMIN — AMOXICILLIN AND CLAVULANATE POTASSIUM 500 MG: 500; 125 TABLET, FILM COATED ORAL at 12:26

## 2018-05-05 RX ADMIN — INSULIN ASPART 3 UNITS: 100 INJECTION, SOLUTION INTRAVENOUS; SUBCUTANEOUS at 21:52

## 2018-05-05 RX ADMIN — HYDROCORTISONE: 1 CREAM TOPICAL at 21:16

## 2018-05-05 RX ADMIN — NYSTATIN: 100000 POWDER TOPICAL at 09:36

## 2018-05-05 RX ADMIN — HYDROCORTISONE: 1 CREAM TOPICAL at 09:36

## 2018-05-05 RX ADMIN — DULOXETINE HYDROCHLORIDE 20 MG: 20 CAPSULE, DELAYED RELEASE ORAL at 21:16

## 2018-05-05 RX ADMIN — AMOXICILLIN AND CLAVULANATE POTASSIUM 500 MG: 500; 125 TABLET, FILM COATED ORAL at 21:16

## 2018-05-05 NOTE — PLAN OF CARE
Problem: Patient Care Overview  Goal: Plan of Care Review  Outcome: Ongoing (interventions implemented as appropriate)   05/05/18 0741   Coping/Psychosocial   Plan of Care Reviewed With patient;family   Plan of Care Review   Progress no change   OTHER   Outcome Summary Patient pulled out her IJ this morning. Peripheral IV accessed gained.      Goal: Individualization and Mutuality  Outcome: Ongoing (interventions implemented as appropriate)    Goal: Discharge Needs Assessment  Outcome: Ongoing (interventions implemented as appropriate)    Goal: Interprofessional Rounds/Family Conf  Outcome: Ongoing (interventions implemented as appropriate)      Problem: Fall Risk (Adult)  Goal: Absence of Fall  Outcome: Ongoing (interventions implemented as appropriate)      Problem: Skin Injury Risk (Adult)  Goal: Skin Health and Integrity  Outcome: Ongoing (interventions implemented as appropriate)      Problem: Urinary Tract Infection (Adult)  Goal: Signs and Symptoms of Listed Potential Problems Will be Absent, Minimized or Managed (Urinary Tract Infection)  Outcome: Ongoing (interventions implemented as appropriate)      Problem: Pain, Chronic (Adult)  Goal: Acceptable Pain/Comfort Level and Functional Ability  Outcome: Ongoing (interventions implemented as appropriate)

## 2018-05-05 NOTE — PROGRESS NOTES
FAMILY MEDICINE PROGRESS NOTE  NAME: Tessy Gonzalez  : 1940  MRN: [id number]     LOS: 6 days   Conditional Code  PROVIDER OF SERVICE:     Chief Complaint:  Sepsis due to Klebsiella pneumoniae    Subjective     Interval History:  History taken from: patient chart RN  Subjective: Patient is a 76 yo  female who was admitted with septic shock.  She is sleepy today.  She ate breakfast okay..    Review of Systems:   Review of Systems   Unable to perform ROS: Mental status change       Objective     Vital Signs  Temp:  [96.3 °F (35.7 °C)-98.7 °F (37.1 °C)] 97.2 °F (36.2 °C)  Heart Rate:  [64-74] 70  Resp:  [18-22] 20  BP: (140-160)/(62-72) 140/72    Physical Exam  Physical Exam   Constitutional: She appears well-developed and well-nourished. No distress.   Cardiovascular: Normal rate, regular rhythm, normal heart sounds and intact distal pulses.  Exam reveals no gallop and no friction rub.    No murmur heard.  Pulmonary/Chest: No respiratory distress. She has no wheezes. She has no rales.   Diminished breath sounds in the bases, poor inspiratory effort   Abdominal: Soft. Bowel sounds are normal. She exhibits no distension. There is tenderness. There is no rebound and no guarding.   Tender left lower quadrant, patient grimaces   Musculoskeletal: She exhibits deformity.   Right lower leg lateral aspect large groove   Neurological:   lethargic   Skin: She is not diaphoretic.   Nursing note and vitals reviewed.      Medication Review    Current Facility-Administered Medications:   •  acetaminophen (TYLENOL) tablet 650 mg, 650 mg, Oral, Q6H PRN, Jason Beckman MD  •  ALPRAZolam (XANAX) tablet 0.25 mg, 0.25 mg, Oral, Nightly PRN, Glo Kirk MD  •  amoxicillin-clavulanate (AUGMENTIN) 500-125 MG per tablet 500 mg, 1 tablet, Oral, Q12H, Glo Kirk MD, 500 mg at 18 1226  •  dextrose (D50W) solution 25 g, 25 g, Intravenous, Q15 Min PRN, Jason Beckman MD, 25 g at 18 0503  •  dextrose (GLUTOSE)  oral gel 15 g, 15 g, Oral, Q15 Min PRN, Jason Beckman MD  •  DULoxetine (CYMBALTA) DR capsule 20 mg, 20 mg, Oral, Nightly, Glo Kirk MD, 20 mg at 05/04/18 2027  •  enalaprilat (VASOTEC) injection 1.25 mg, 1.25 mg, Intravenous, Q6H PRN, Glo Kirk MD, 1.25 mg at 05/04/18 1155  •  glucagon (human recombinant) (GLUCAGEN DIAGNOSTIC) injection 1 mg, 1 mg, Subcutaneous, PRN, Jason Beckman MD  •  insulin aspart (novoLOG) injection 0-14 Units, 0-14 Units, Subcutaneous, 4x Daily AC & at Bedtime, Jason Beckman MD, 3 Units at 05/05/18 1119  •  insulin detemir (LEVEMIR) injection 12 Units, 12 Units, Subcutaneous, Nightly, Gol Kirk MD, 12 Units at 05/04/18 2029  •  ipratropium-albuterol (DUO-NEB) nebulizer solution 3 mL, 3 mL, Nebulization, 4x Daily - RT, Glo Kirk MD  •  lisinopril (PRINIVIL,ZESTRIL) tablet 10 mg, 10 mg, Oral, Q24H, Glo Kirk MD, 10 mg at 05/05/18 0936  •  magic butt ointment, , Topical, BID, Kulwinder Hernandez MD  •  morphine injection 1 mg, 1 mg, Intravenous, Q4H PRN, 1 mg at 05/03/18 1734 **AND** naloxone (NARCAN) injection 0.4 mg, 0.4 mg, Intravenous, Q5 Min PRN, Jason Beckman MD  •  nystatin (MYCOSTATIN) powder, , Topical, Q12H, Jason Beckman MD  •  Pharmacy to Dose LevoFLOXacin (LEVAQUIN), , Does not apply, Continuous PRN, Kulwinder Hernandez MD  •  sodium chloride 0.9 % flush 1-10 mL, 1-10 mL, Intravenous, PRN, Jason Beckman MD     Diagnostic Data      I reviewed the patient's new clinical results and imaging.      Assessment/Plan     Active Hospital Problems (** Indicates Principal Problem)    Diagnosis Date Noted   • **Sepsis due to Klebsiella pneumoniae [A41.4] 04/29/2018   • Proctitis [K62.89] 05/05/2018   • Angina at rest [I20.8] 05/04/2018   • Dementia with behavioral disturbance [F03.91] 05/04/2018   • Left lower quadrant pain [R10.32] 05/04/2018   • Generalized anxiety disorder [F41.1] 05/04/2018   • Chronic pain syndrome [G89.4] 05/04/2018   • Acute on chronic renal failure  [N17.9, N18.9] 04/30/2018   • Bacteremia due to Klebsiella pneumoniae [R78.81] 04/30/2018   • UTI (urinary tract infection) [N39.0] 04/29/2018   • Long-term insulin use in type 2 diabetes [E11.9, Z79.4] 04/29/2018   • Hypertension [I10] 04/29/2018   • Morbid obesity [E66.01]       Resolved Hospital Problems    Diagnosis Date Noted Date Resolved   • Septic shock [A41.9, R65.21] 04/29/2018 05/01/2018     Change antibiotics to Augmentin for coverage of cystitis, bacteremia, and proctitis.  Continue to monitor.  Hopefully discharge in the next couple of days if white count trends down.  Family wants to take her home.    DVT prophylaxis: SCDs/TEDs      Disposition:Home with home health          This document has been electronically signed by Glo Kirk MD on May 5, 2018 1:37 PM

## 2018-05-05 NOTE — PLAN OF CARE
Problem: Patient Care Overview  Goal: Plan of Care Review  Outcome: Ongoing (interventions implemented as appropriate)  Pt rested well this shift   05/04/18 0323   Coping/Psychosocial   Plan of Care Reviewed With patient   Plan of Care Review   Progress improving       Problem: Urinary Tract Infection (Adult)  Goal: Signs and Symptoms of Listed Potential Problems Will be Absent, Minimized or Managed (Urinary Tract Infection)  Outcome: Ongoing (interventions implemented as appropriate)      Problem: Pain, Chronic (Adult)  Goal: Acceptable Pain/Comfort Level and Functional Ability  Outcome: Ongoing (interventions implemented as appropriate)

## 2018-05-05 NOTE — SIGNIFICANT NOTE
05/05/18 0920   Rehab Treatment   Discipline occupational therapy assistant   Reason Treatment Not Performed patient/family declined treatment  (Pt declined tx this date. [facility] encouraged pt to participate but pt continued to declined.)

## 2018-05-06 PROBLEM — E11.69 ONYCHOMYCOSIS OF MULTIPLE TOENAILS WITH TYPE 2 DIABETES MELLITUS AND PERIPHERAL NEUROPATHY (HCC): Status: ACTIVE | Noted: 2018-05-06

## 2018-05-06 PROBLEM — E11.42 ONYCHOMYCOSIS OF MULTIPLE TOENAILS WITH TYPE 2 DIABETES MELLITUS AND PERIPHERAL NEUROPATHY (HCC): Status: ACTIVE | Noted: 2018-05-06

## 2018-05-06 PROBLEM — E87.6 HYPOKALEMIA: Status: ACTIVE | Noted: 2018-05-06

## 2018-05-06 PROBLEM — B35.1 ONYCHOMYCOSIS OF MULTIPLE TOENAILS WITH TYPE 2 DIABETES MELLITUS AND PERIPHERAL NEUROPATHY (HCC): Status: ACTIVE | Noted: 2018-05-06

## 2018-05-06 LAB
ALBUMIN SERPL-MCNC: 2.4 G/DL (ref 3.4–4.8)
ALBUMIN/GLOB SERPL: 0.7 G/DL (ref 1.1–1.8)
ALP SERPL-CCNC: 104 U/L (ref 38–126)
ALT SERPL W P-5'-P-CCNC: 22 U/L (ref 9–52)
ANION GAP SERPL CALCULATED.3IONS-SCNC: 5 MMOL/L (ref 5–15)
AST SERPL-CCNC: 20 U/L (ref 14–36)
BASOPHILS # BLD AUTO: 0.07 10*3/MM3 (ref 0–0.2)
BASOPHILS NFR BLD AUTO: 0.4 % (ref 0–2)
BILIRUB SERPL-MCNC: 0.5 MG/DL (ref 0.2–1.3)
BUN BLD-MCNC: 25 MG/DL (ref 7–21)
BUN/CREAT SERPL: 25.3 (ref 7–25)
CALCIUM SPEC-SCNC: 8.4 MG/DL (ref 8.4–10.2)
CHLORIDE SERPL-SCNC: 102 MMOL/L (ref 95–110)
CO2 SERPL-SCNC: 28 MMOL/L (ref 22–31)
CREAT BLD-MCNC: 0.99 MG/DL (ref 0.5–1)
DEPRECATED RDW RBC AUTO: 40.6 FL (ref 36.4–46.3)
EOSINOPHIL # BLD AUTO: 0.48 10*3/MM3 (ref 0–0.7)
EOSINOPHIL NFR BLD AUTO: 2.5 % (ref 0–7)
ERYTHROCYTE [DISTWIDTH] IN BLOOD BY AUTOMATED COUNT: 12.7 % (ref 11.5–14.5)
GFR SERPL CREATININE-BSD FRML MDRD: 55 ML/MIN/1.73 (ref 60–90)
GLOBULIN UR ELPH-MCNC: 3.4 GM/DL (ref 2.3–3.5)
GLUCOSE BLD-MCNC: 142 MG/DL (ref 60–100)
GLUCOSE BLDC GLUCOMTR-MCNC: 233 MG/DL (ref 70–130)
GLUCOSE BLDC GLUCOMTR-MCNC: 261 MG/DL (ref 70–130)
GLUCOSE BLDC GLUCOMTR-MCNC: 275 MG/DL (ref 70–130)
HCT VFR BLD AUTO: 31.2 % (ref 35–45)
HGB BLD-MCNC: 10.7 G/DL (ref 12–15.5)
IMM GRANULOCYTES # BLD: 0.99 10*3/MM3 (ref 0–0.02)
IMM GRANULOCYTES NFR BLD: 5.2 % (ref 0–0.5)
LYMPHOCYTES # BLD AUTO: 4.16 10*3/MM3 (ref 0.6–4.2)
LYMPHOCYTES NFR BLD AUTO: 21.9 % (ref 10–50)
MCH RBC QN AUTO: 30.5 PG (ref 26.5–34)
MCHC RBC AUTO-ENTMCNC: 34.3 G/DL (ref 31.4–36)
MCV RBC AUTO: 88.9 FL (ref 80–98)
MONOCYTES # BLD AUTO: 1.82 10*3/MM3 (ref 0–0.9)
MONOCYTES NFR BLD AUTO: 9.6 % (ref 0–12)
NEUTROPHILS # BLD AUTO: 11.44 10*3/MM3 (ref 2–8.6)
NEUTROPHILS NFR BLD AUTO: 60.4 % (ref 37–80)
PLATELET # BLD AUTO: 175 10*3/MM3 (ref 150–450)
PMV BLD AUTO: 11.3 FL (ref 8–12)
POTASSIUM BLD-SCNC: 3.4 MMOL/L (ref 3.5–5.1)
PROT SERPL-MCNC: 5.8 G/DL (ref 6.3–8.6)
RBC # BLD AUTO: 3.51 10*6/MM3 (ref 3.77–5.16)
SODIUM BLD-SCNC: 135 MMOL/L (ref 137–145)
WBC NRBC COR # BLD: 18.96 10*3/MM3 (ref 3.2–9.8)

## 2018-05-06 PROCEDURE — 94760 N-INVAS EAR/PLS OXIMETRY 1: CPT

## 2018-05-06 PROCEDURE — 94799 UNLISTED PULMONARY SVC/PX: CPT

## 2018-05-06 PROCEDURE — 82962 GLUCOSE BLOOD TEST: CPT

## 2018-05-06 PROCEDURE — 85025 COMPLETE CBC W/AUTO DIFF WBC: CPT | Performed by: FAMILY MEDICINE

## 2018-05-06 PROCEDURE — 80053 COMPREHEN METABOLIC PANEL: CPT | Performed by: FAMILY MEDICINE

## 2018-05-06 PROCEDURE — 97535 SELF CARE MNGMENT TRAINING: CPT

## 2018-05-06 PROCEDURE — 63710000001 INSULIN DETEMIR PER 5 UNITS: Performed by: FAMILY MEDICINE

## 2018-05-06 PROCEDURE — 99232 SBSQ HOSP IP/OBS MODERATE 35: CPT | Performed by: FAMILY MEDICINE

## 2018-05-06 PROCEDURE — 63710000001 INSULIN ASPART PER 5 UNITS: Performed by: INTERNAL MEDICINE

## 2018-05-06 PROCEDURE — 11719 TRIM NAIL(S) ANY NUMBER: CPT | Performed by: FAMILY MEDICINE

## 2018-05-06 RX ORDER — POTASSIUM CHLORIDE 750 MG/1
20 CAPSULE, EXTENDED RELEASE ORAL DAILY
Status: DISCONTINUED | OUTPATIENT
Start: 2018-05-06 | End: 2018-05-11 | Stop reason: HOSPADM

## 2018-05-06 RX ADMIN — Medication: at 20:29

## 2018-05-06 RX ADMIN — DULOXETINE HYDROCHLORIDE 20 MG: 20 CAPSULE, DELAYED RELEASE ORAL at 20:30

## 2018-05-06 RX ADMIN — INSULIN ASPART 5 UNITS: 100 INJECTION, SOLUTION INTRAVENOUS; SUBCUTANEOUS at 11:02

## 2018-05-06 RX ADMIN — IPRATROPIUM BROMIDE AND ALBUTEROL SULFATE 3 ML: .5; 3 SOLUTION RESPIRATORY (INHALATION) at 14:03

## 2018-05-06 RX ADMIN — LISINOPRIL 10 MG: 10 TABLET ORAL at 08:33

## 2018-05-06 RX ADMIN — NYSTATIN: 100000 POWDER TOPICAL at 20:29

## 2018-05-06 RX ADMIN — IPRATROPIUM BROMIDE AND ALBUTEROL SULFATE 3 ML: .5; 3 SOLUTION RESPIRATORY (INHALATION) at 10:27

## 2018-05-06 RX ADMIN — INSULIN ASPART 8 UNITS: 100 INJECTION, SOLUTION INTRAVENOUS; SUBCUTANEOUS at 17:22

## 2018-05-06 RX ADMIN — AMOXICILLIN AND CLAVULANATE POTASSIUM 500 MG: 500; 125 TABLET, FILM COATED ORAL at 20:30

## 2018-05-06 RX ADMIN — ACETAMINOPHEN 650 MG: 325 TABLET ORAL at 11:15

## 2018-05-06 RX ADMIN — Medication: at 13:29

## 2018-05-06 RX ADMIN — HYDROCORTISONE: 1 CREAM TOPICAL at 08:33

## 2018-05-06 RX ADMIN — IPRATROPIUM BROMIDE AND ALBUTEROL SULFATE 3 ML: .5; 3 SOLUTION RESPIRATORY (INHALATION) at 19:11

## 2018-05-06 RX ADMIN — POTASSIUM CHLORIDE 20 MEQ: 750 CAPSULE, EXTENDED RELEASE ORAL at 11:03

## 2018-05-06 RX ADMIN — NYSTATIN: 100000 POWDER TOPICAL at 08:33

## 2018-05-06 RX ADMIN — AMOXICILLIN AND CLAVULANATE POTASSIUM 500 MG: 500; 125 TABLET, FILM COATED ORAL at 08:33

## 2018-05-06 RX ADMIN — INSULIN ASPART 8 UNITS: 100 INJECTION, SOLUTION INTRAVENOUS; SUBCUTANEOUS at 21:33

## 2018-05-06 RX ADMIN — HYDROCORTISONE: 1 CREAM TOPICAL at 20:29

## 2018-05-06 RX ADMIN — IPRATROPIUM BROMIDE AND ALBUTEROL SULFATE 3 ML: .5; 3 SOLUTION RESPIRATORY (INHALATION) at 06:59

## 2018-05-06 RX ADMIN — INSULIN DETEMIR 14 UNITS: 100 INJECTION, SOLUTION SUBCUTANEOUS at 20:29

## 2018-05-06 NOTE — PLAN OF CARE
Problem: Patient Care Overview  Goal: Plan of Care Review  Outcome: Ongoing (interventions implemented as appropriate)  Pt screams when touched   05/05/18 0755   Coping/Psychosocial   Plan of Care Reviewed With patient;family   Plan of Care Review   Progress no change   OTHER   Outcome Summary Patient pulled out her IJ this morning. Peripheral IV accessed gained.      Goal: Individualization and Mutuality  Outcome: Ongoing (interventions implemented as appropriate)    Goal: Discharge Needs Assessment  Outcome: Ongoing (interventions implemented as appropriate)      Problem: Fall Risk (Adult)  Goal: Absence of Fall  Outcome: Ongoing (interventions implemented as appropriate)      Problem: Urinary Tract Infection (Adult)  Goal: Signs and Symptoms of Listed Potential Problems Will be Absent, Minimized or Managed (Urinary Tract Infection)  Outcome: Ongoing (interventions implemented as appropriate)      Problem: Pain, Chronic (Adult)  Goal: Acceptable Pain/Comfort Level and Functional Ability  Outcome: Ongoing (interventions implemented as appropriate)

## 2018-05-06 NOTE — PLAN OF CARE
Problem: Patient Care Overview  Goal: Plan of Care Review  Outcome: Ongoing (interventions implemented as appropriate)   05/06/18 7669   Coping/Psychosocial   Plan of Care Reviewed With patient   OTHER   Outcome Summary slow progress toward goals pt family stated pt only feeds self washes face hands at home . requires assist for all other tasks

## 2018-05-06 NOTE — PROGRESS NOTES
FAMILY MEDICINE PROGRESS NOTE  NAME: Tessy Gonzalez  : 1940  MRN: [id number]     LOS: 7 days   Conditional Code  PROVIDER OF SERVICE:     Chief Complaint:  Sepsis due to Klebsiella pneumoniae    Subjective     Interval History:  History taken from: patient chart RN  Subjective: Patient is a 78 yo  female who was admitted with septic shock.  She ate breakfast but not much lunch.  She needs her toenails trimmed.  They are very thick and hard to cut.    Review of Systems:   Review of Systems   Unable to perform ROS: Mental status change       Objective     Vital Signs  Temp:  [97.6 °F (36.4 °C)-98.8 °F (37.1 °C)] 97.9 °F (36.6 °C)  Heart Rate:  [65-76] 75  Resp:  [16-20] 20  BP: (131-147)/(60-71) 147/63    Physical Exam  Physical Exam   Constitutional: She appears well-developed and well-nourished. No distress.   Cardiovascular: Normal rate, regular rhythm, normal heart sounds and intact distal pulses.  Exam reveals no gallop and no friction rub.    No murmur heard.  Pulmonary/Chest: No respiratory distress. She has no wheezes. She has no rales.   Diminished breath sounds in the bases, poor inspiratory effort   Abdominal: Soft. Bowel sounds are normal. She exhibits no distension. There is no tenderness. There is no rebound and no guarding.   Musculoskeletal: She exhibits deformity.   Right lower leg lateral aspect large groove   Neurological: She is alert.   Skin: She is not diaphoretic.   Toenails thick and flaky, trimmed all 10 with nail trimmers   Nursing note and vitals reviewed.      Medication Review    Current Facility-Administered Medications:   •  8-hydroxyquinoline sulfate (BAG BALM) ointment, , Apply externally, BID, Glo Kirk MD  •  acetaminophen (TYLENOL) tablet 650 mg, 650 mg, Oral, Q6H PRN, Jason Beckman MD, 650 mg at 18 1115  •  ALPRAZolam (XANAX) tablet 0.25 mg, 0.25 mg, Oral, Nightly PRN, Glo Kirk MD  •  amoxicillin-clavulanate (AUGMENTIN) 500-125 MG per tablet 500  mg, 1 tablet, Oral, Q12H, Glo Kirk MD, 500 mg at 05/06/18 0833  •  dextrose (D50W) solution 25 g, 25 g, Intravenous, Q15 Min PRN, Jason Beckman MD, 25 g at 05/01/18 0503  •  dextrose (GLUTOSE) oral gel 15 g, 15 g, Oral, Q15 Min PRN, Jason Beckman MD  •  DULoxetine (CYMBALTA) DR capsule 20 mg, 20 mg, Oral, Nightly, Glo Kirk MD, 20 mg at 05/05/18 2116  •  enalaprilat (VASOTEC) injection 1.25 mg, 1.25 mg, Intravenous, Q6H PRN, Glo Kirk MD, 1.25 mg at 05/04/18 1155  •  glucagon (human recombinant) (GLUCAGEN DIAGNOSTIC) injection 1 mg, 1 mg, Subcutaneous, PRN, Jason Beckman MD  •  insulin aspart (novoLOG) injection 0-14 Units, 0-14 Units, Subcutaneous, 4x Daily AC & at Bedtime, Jason Beckman MD, 5 Units at 05/06/18 1102  •  insulin detemir (LEVEMIR) injection 14 Units, 14 Units, Subcutaneous, Nightly, Glo Kirk MD  •  ipratropium-albuterol (DUO-NEB) nebulizer solution 3 mL, 3 mL, Nebulization, 4x Daily - RT, Glo Kirk MD, 3 mL at 05/06/18 1027  •  lisinopril (PRINIVIL,ZESTRIL) tablet 10 mg, 10 mg, Oral, Q24H, Glo Kirk MD, 10 mg at 05/06/18 0833  •  magic butt ointment, , Topical, BID, Kulwinder Hernandez MD  •  morphine injection 1 mg, 1 mg, Intravenous, Q4H PRN, 1 mg at 05/03/18 1734 **AND** naloxone (NARCAN) injection 0.4 mg, 0.4 mg, Intravenous, Q5 Min PRN, Jason Beckman MD  •  nystatin (MYCOSTATIN) powder, , Topical, Q12H, Jason Beckman MD  •  potassium chloride (MICRO-K) CR capsule 20 mEq, 20 mEq, Oral, Daily, Glo Kirk MD, 20 mEq at 05/06/18 1103  •  sodium chloride 0.9 % flush 1-10 mL, 1-10 mL, Intravenous, PRN, Jason Beckman MD     Diagnostic Data      I reviewed the patient's new clinical results and imaging.      Assessment/Plan     Active Hospital Problems (** Indicates Principal Problem)    Diagnosis Date Noted   • **Sepsis due to Klebsiella pneumoniae [A41.4] 04/29/2018   • Hypokalemia [E87.6] 05/06/2018   • Onychomycosis of multiple toenails with type 2 diabetes  mellitus and peripheral neuropathy [E11.42, B35.1, E11.69] 05/06/2018     Nails trimmed     • Proctitis [K62.89] 05/05/2018   • Angina at rest [I20.8] 05/04/2018   • Dementia with behavioral disturbance [F03.91] 05/04/2018   • Left lower quadrant pain [R10.32] 05/04/2018   • Generalized anxiety disorder [F41.1] 05/04/2018   • Chronic pain syndrome [G89.4] 05/04/2018   • Acute on chronic renal failure [N17.9, N18.9] 04/30/2018   • Bacteremia due to Klebsiella pneumoniae [R78.81] 04/30/2018   • UTI (urinary tract infection) [N39.0] 04/29/2018   • Long-term insulin use in type 2 diabetes [E11.9, Z79.4] 04/29/2018   • Hypertension [I10] 04/29/2018   • Morbid obesity [E66.01]       Resolved Hospital Problems    Diagnosis Date Noted Date Resolved   • Septic shock [A41.9, R65.21] 04/29/2018 05/01/2018     Nails trimmed at bedside.  Order bag balm for her dry feet and legs.  White count continues to trend down will continue to monitor.  Hopefully discharge in the next day or two.    DVT prophylaxis: SCDs/TEDs      Disposition:Home with home health          This document has been electronically signed by Glo Kirk MD on May 6, 2018 1:25 PM

## 2018-05-06 NOTE — THERAPY TREATMENT NOTE
Inpatient Rehabilitation - Occupational Therapy Treatment Note  [facility]     Patient Name: Tessy Gonzalez  : 1940  MRN: [id number]  Today's Date: 2018  Onset of Illness/Injury or Date of Surgery: 18  Date of Referral to OT: 18  Referring Physician: Dr. Glo Kirk    Admit Date: 2018       ICD-10-CM ICD-9-CM   1. Impaired mobility and ADLs Z74.09 799.89   2. Impaired physical mobility Z74.09 781.99   3. Benign hypertensive heart disease without heart failure I11.9 402.10   4. Benign hypertension I10 401.1   5. Chest pain, unspecified type R07.9 786.50     Patient Active Problem List   Diagnosis   • Sepsis due to Klebsiella pneumoniae   • UTI (urinary tract infection)   • Hypertension   • Long-term insulin use in type 2 diabetes   • CKD (chronic kidney disease) stage 3, GFR 30-59 ml/min   • Morbid obesity   • Acute on chronic renal failure   • Bacteremia due to Klebsiella pneumoniae   • Angina at rest   • Dementia with behavioral disturbance   • Left lower quadrant pain   • Generalized anxiety disorder   • Chronic pain syndrome   • Proctitis   • Hypokalemia   • Onychomycosis of multiple toenails with type 2 diabetes mellitus and peripheral neuropathy     Past Medical History:   Diagnosis Date   • Atherosclerosis    • Basal cell carcinoma    • Basal cell carcinoma of medial canthus, left    • Basal cell carcinoma of nose 2000    right lateral    • Bilateral leg edema    • Cellulitis    • CKD (chronic kidney disease) stage 3, GFR 30-59 ml/min    • Degenerative joint disease involving multiple joints    • Dementia    • Depression    • Diabetes mellitus    • Diabetic nephropathy    • Diabetic neuropathy    • Diastolic dysfunction    • Foot abscess    • GERD (gastroesophageal reflux disease)    • Hypertension    • Ischemic stroke    • Morbid obesity    • Peripheral venous insufficiency    • Recurrent UTI (urinary tract infection)      Past Surgical History:   Procedure Laterality  Date   • BIOPSY SKIN / SQ / MUCOUS MEMBRANE     • HIP ARTHROPLASTY Right    • SKIN GRAFT      taken from the neck for the face   • VEIN LIGATION AND STRIPPING BILATERAL         Therapy Treatment          Rehabilitation Treatment Summary     Row Name 05/06/18 1105             Treatment Time/Intention    Discipline occupational therapy assistant  -LM      Document Type therapy note (daily note)  -LM      Subjective Information complains of;pain   pt denied OT only agrees to participate in feeding act / ed   -LM      Mode of Treatment individual therapy;occupational therapy  -LM      Recorded by [LM] ODALIS Pagan/L 05/06/18 1525 05/06/18 1525      Row Name 05/06/18 1105             ADL Assessment/Intervention    BADL Assessment/Intervention feeding  -LM      Recorded by [LM] ODALIS Pagan/L 05/06/18 1525 05/06/18 1525      Row Name 05/06/18 1105             Self-Feeding Assessment/Training    [place] Level (Feeding) feeding skills;scoop food and bring to mouth;set up  -LM      Recorded by [LM] TORI PaganA/L 05/06/18 1525 05/06/18 1525      Row Name 05/06/18 1105             Positioning and Restraints    Pre-Treatment Position in bed  -LM      Post Treatment Position bed  -LM      In Bed with family/caregiver  -LM      Recorded by [LM] ODLAIS Pagan/L 05/06/18 1525 05/06/18 1525      Row Name 05/06/18 1105             Pain Scale: Numbers Pre/Post-Treatment    Pain Scale: Numbers, Pretreatment --   pt not verbalizing pain number   -LM      Recorded by [LM] ODALIS Pagan/L 05/06/18 1525 05/06/18 1525      Row Name                Wound 05/01/18 1700 Right      Wound - Properties Group Date first assessed: 05/01/18 [MM] Time first assessed: 1700 [MM] Present On Admission : no [MM] Side: Right [MM] Recorded by:  [MM] Celeste Plata RN 05/01/18 1840 05/01/18 1840    Row Name                Wound 05/01/18 1700 Bilateral      Wound - Properties Group Date first  assessed: 05/01/18 [MM] Time first assessed: 1700 [MM] Present On Admission : no [MM] Side: Bilateral [MM] Recorded by:  [MM] Celeste Plata RN 05/01/18 1841 05/01/18 1841    Row Name                Wound 05/02/18 0700 Left  abrasion    Wound - Properties Group Date first assessed: 05/02/18 [MM] Time first assessed: 0700 [MM] Present On Admission : no [MM] Side: Left [MM] Type: abrasion [MM] Recorded by:  [MM] Celeste Plata RN 05/02/18 1933 05/02/18 1933    Row Name 05/06/18 1105             Outcome Summary/Treatment Plan (OT)    Daily Summary of Progress (OT) progress toward functional goals is gradual  -LM      Recorded by [LM] Lynda Howell JACOBO/L 05/06/18 1525 05/06/18 1525        User Key  (r) = Recorded By, (t) = Taken By, (c) = Cosigned By    Initials Name Effective Dates Discipline    MM Celeste Plata RN 10/17/16 -  Nurse    LM Lynda Howell JACOBO/L 03/07/18 -  OT        Wound 05/01/18 1700 Right   (Active)   Dressing Appearance open to air 5/6/2018  7:10 AM   Base pink 5/6/2018  7:10 AM   Drainage Amount none 5/6/2018  7:10 AM   Care, Wound barrier applied 5/6/2018  7:10 AM       Wound 05/01/18 1700 Bilateral   (Active)   Dressing Appearance open to air 5/6/2018  7:10 AM   Base other (see comments) 5/6/2018  7:10 AM   Periwound intact;blanchable;excoriated 5/6/2018  7:10 AM   Drainage Amount none 5/6/2018  7:10 AM   Care, Wound barrier applied 5/6/2018  7:10 AM   Periwound Care, Wound barrier ointment applied 5/6/2018  7:10 AM       Wound 05/02/18 0700 Left  abrasion (Active)   Dressing Appearance open to air 5/6/2018  7:10 AM   Base red/granulating 5/6/2018  7:10 AM             OT Rehab Goals     Row Name 05/06/18 1525             Grooming Goal 1 (OT)    Activity/Device (Grooming Goal 1, OT) oral care;wash face, hands  -LM      [place] (Grooming Goal 1, OT) set-up required;tactile cues required;verbal cues required;minimum assist (75% or more patient effort)  -LM      Time Frame  (Grooming Goal 1, OT) long term goal (LTG);by discharge  -LM      Progress/Outcome (Grooming Goal 1, OT) goal not met  -LM         Self-Feeding Goal 1 (OT)    Activity/Assistive Device (Self-Feeding Goal 1, OT) self-feeding skills, all  -LM      [place] Level/Cues Needed (Self-Feeding Goal 1, OT) set-up required;supervision required;tactile cues required;verbal cues required  -LM      Time Frame (Self-Feeding Goal 1, OT) long term goal (LTG);by discharge  -LM      Progress/Outcomes (Self-Feeding Goal 1, OT) goal met  -LM        User Key  (r) = Recorded By, (t) = Taken By, (c) = Cosigned By    Initials Name Provider Type Discipline    ANNALISE Pearl Occupational Therapy Assistant OT        Occupational Therapy Education     Title: PT OT SLP Therapies (Active)     Topic: Occupational Therapy (Active)     Point: ADL training (Active)     Description: Instruct learner(s) on proper safety adaptation and remediation techniques during self care or transfers.   Instruct in proper use of assistive devices.   Learning Progress Summary     Learner Status Readiness Method Response Comment Documented by    Patient Active Nonacceptance E NR   05/06/18 1527     Done Acceptance E,TB,D VU,NR   05/04/18 1253     Done Acceptance E VU,NR Educated about OT and POC. Educated to call for assist.  05/02/18 1049    Family Active Nonacceptance E NR   05/06/18 1527     Done Acceptance E VU,NR Educated about OT and POC. Educated to call for assist.  05/02/18 1049          Point: Home exercise program (Active)     Description: Instruct learner(s) on appropriate technique for monitoring, assisting and/or progressing therapeutic exercises/activities.   Learning Progress Summary     Learner Status Readiness Method Response Comment Documented by    Patient Active Nonacceptance E NR  LM 05/06/18 1527     Done Acceptance E,TB,D VU,NR   05/04/18 1253     Active Acceptance E NR   05/03/18 1531    Family Active  Nonacceptance E [facility] 05/06/18 1527          Point: Precautions (Active)     Description: Instruct learner(s) on prescribed precautions during self-care and functional transfers.   Learning Progress Summary     Learner Status Readiness Method Response Comment Documented by    Patient Active Nonacceptance E NR   05/06/18 1527     Done Acceptance E,TB,D VU,NR   05/04/18 1253    Family Active Nonacceptance E [facility] 05/06/18 1527          Point: Body mechanics (Active)     Description: Instruct learner(s) on proper positioning and spine alignment during self-care, functional mobility activities and/or exercises.   Learning Progress Summary     Learner Status Readiness Method Response Comment Documented by    Patient Active Nonacceptance E [facility] 05/06/18 1527     Done Acceptance E,TB,D VU,NR   05/04/18 1253    Family Active Nonacceptance E [facility] 05/06/18 1527                      User Key     Initials Effective Dates Name Provider Type Discipline     10/17/16 -  Rea Robbins OTR/L Occupational Therapist OT     03/07/18 -  Shreya Vazquez JACOBO/L Occupational Therapy Assistant OT     03/07/18 -  Lynda Howell JACOBO/L Occupational Therapy Assistant OT     03/07/18 -  Marlyn Osorio JACOBO/L Occupational Therapy Assistant OT                OT Recommendation and Plan  Outcome Summary/Treatment Plan (OT)  Daily Summary of Progress (OT): progress toward functional goals is gradual  Daily Summary of Progress (OT): progress toward functional goals is gradual  Plan of Care Review  Plan of Care Reviewed With: patient  Plan of Care Reviewed With: patient  Outcome Summary: slow progress toward goals pt family stated pt only feeds self washes face hands at home . requires assist for all other tasks        Outcome Measures     Row Name 05/04/18 1200             How much help from another is currently needed...    Putting on and taking off regular lower body clothing? 1  -CS      Bathing (including washing,  rinsing, and drying) 1  -CS      Toileting (which includes using toilet bed pan or urinal) 1  -CS      Putting on and taking off regular upper body clothing 1  -CS      Taking care of personal grooming (such as brushing teeth) 2  -CS      Eating meals 2  -CS      Score 8  -CS         Functional Assessment    Outcome Measure Options AM-PAC 6 Clicks Daily Activity (OT)  -CS        User Key  (r) = Recorded By, (t) = Taken By, (c) = Cosigned By    Initials Name Provider Type    ODALIS Bean/FATUMA Occupational Therapy Assistant           Time Calculation:         Time Calculation- OT     Row Name 05/06/18 1507             Time Calculation- OT    OT Start Time 1110  -LM      OT Stop Time 1135  -LM      OT Time Calculation (min) 25 min  -LM      Total Timed Code Minutes- OT 25 minute(s)  -LM      OT Received On 05/06/18  -LM        User Key  (r) = Recorded By, (t) = Taken By, (c) = Cosigned By    Initials Name Provider Type     ODALIS Pagan/L Occupational Therapy Assistant           Therapy Charges for Today     Code Description Service Date Service Provider Modifiers Qty    06152241568 HC OT SELF CARE/MGMT/TRAIN EA 15 MIN 5/6/2018 ANNALISE Pagan GO 2    52411645796 HC OT THER SUPP EA 15 MIN 5/6/2018 ANNALISE Pagan GO 1          OT G-codes  OT Professional Judgement Used?: Yes  OT Functional Scales Options: AM-PAC 6 Clicks Daily Activity (OT)  Score: 8  Functional Limitation: Self care  Self Care Current Status (): At least 80 percent but less than 100 percent impaired, limited or restricted  Self Care Goal Status (): At least 60 percent but less than 80 percent impaired, limited or restricted    ANNALISE Pagan  5/6/2018

## 2018-05-07 ENCOUNTER — APPOINTMENT (OUTPATIENT)
Dept: GENERAL RADIOLOGY | Facility: HOSPITAL | Age: 78
End: 2018-05-07

## 2018-05-07 LAB
ALBUMIN SERPL-MCNC: 2.4 G/DL (ref 3.4–4.8)
ALBUMIN/GLOB SERPL: 0.7 G/DL (ref 1.1–1.8)
ALP SERPL-CCNC: 99 U/L (ref 38–126)
ALT SERPL W P-5'-P-CCNC: 15 U/L (ref 9–52)
ANION GAP SERPL CALCULATED.3IONS-SCNC: 6 MMOL/L (ref 5–15)
AST SERPL-CCNC: 20 U/L (ref 14–36)
BASOPHILS # BLD AUTO: 0.05 10*3/MM3 (ref 0–0.2)
BASOPHILS NFR BLD AUTO: 0.2 % (ref 0–2)
BILIRUB SERPL-MCNC: 0.5 MG/DL (ref 0.2–1.3)
BUN BLD-MCNC: 22 MG/DL (ref 7–21)
BUN/CREAT SERPL: 25.9 (ref 7–25)
CALCIUM SPEC-SCNC: 8 MG/DL (ref 8.4–10.2)
CHLORIDE SERPL-SCNC: 102 MMOL/L (ref 95–110)
CO2 SERPL-SCNC: 27 MMOL/L (ref 22–31)
CREAT BLD-MCNC: 0.85 MG/DL (ref 0.5–1)
DEPRECATED RDW RBC AUTO: 40.9 FL (ref 36.4–46.3)
EOSINOPHIL # BLD AUTO: 0.54 10*3/MM3 (ref 0–0.7)
EOSINOPHIL NFR BLD AUTO: 2.6 % (ref 0–7)
ERYTHROCYTE [DISTWIDTH] IN BLOOD BY AUTOMATED COUNT: 12.7 % (ref 11.5–14.5)
GFR SERPL CREATININE-BSD FRML MDRD: 65 ML/MIN/1.73 (ref 60–90)
GLOBULIN UR ELPH-MCNC: 3.6 GM/DL (ref 2.3–3.5)
GLUCOSE BLD-MCNC: 116 MG/DL (ref 60–100)
GLUCOSE BLDC GLUCOMTR-MCNC: 105 MG/DL (ref 70–130)
GLUCOSE BLDC GLUCOMTR-MCNC: 202 MG/DL (ref 70–130)
GLUCOSE BLDC GLUCOMTR-MCNC: 241 MG/DL (ref 70–130)
GLUCOSE BLDC GLUCOMTR-MCNC: 258 MG/DL (ref 70–130)
HCT VFR BLD AUTO: 29.5 % (ref 35–45)
HGB BLD-MCNC: 9.9 G/DL (ref 12–15.5)
IMM GRANULOCYTES # BLD: 0.58 10*3/MM3 (ref 0–0.02)
IMM GRANULOCYTES NFR BLD: 2.8 % (ref 0–0.5)
LYMPHOCYTES # BLD AUTO: 3.99 10*3/MM3 (ref 0.6–4.2)
LYMPHOCYTES NFR BLD AUTO: 19.6 % (ref 10–50)
MCH RBC QN AUTO: 29.8 PG (ref 26.5–34)
MCHC RBC AUTO-ENTMCNC: 33.6 G/DL (ref 31.4–36)
MCV RBC AUTO: 88.9 FL (ref 80–98)
MONOCYTES # BLD AUTO: 1.8 10*3/MM3 (ref 0–0.9)
MONOCYTES NFR BLD AUTO: 8.8 % (ref 0–12)
NEUTROPHILS # BLD AUTO: 13.42 10*3/MM3 (ref 2–8.6)
NEUTROPHILS NFR BLD AUTO: 66 % (ref 37–80)
PLATELET # BLD AUTO: 230 10*3/MM3 (ref 150–450)
PMV BLD AUTO: 11.2 FL (ref 8–12)
POTASSIUM BLD-SCNC: 3.5 MMOL/L (ref 3.5–5.1)
PROT SERPL-MCNC: 6 G/DL (ref 6.3–8.6)
RBC # BLD AUTO: 3.32 10*6/MM3 (ref 3.77–5.16)
SODIUM BLD-SCNC: 135 MMOL/L (ref 137–145)
WBC NRBC COR # BLD: 20.38 10*3/MM3 (ref 3.2–9.8)

## 2018-05-07 PROCEDURE — 80053 COMPREHEN METABOLIC PANEL: CPT | Performed by: FAMILY MEDICINE

## 2018-05-07 PROCEDURE — 94760 N-INVAS EAR/PLS OXIMETRY 1: CPT

## 2018-05-07 PROCEDURE — 99232 SBSQ HOSP IP/OBS MODERATE 35: CPT | Performed by: FAMILY MEDICINE

## 2018-05-07 PROCEDURE — 94799 UNLISTED PULMONARY SVC/PX: CPT

## 2018-05-07 PROCEDURE — 82962 GLUCOSE BLOOD TEST: CPT

## 2018-05-07 PROCEDURE — 71046 X-RAY EXAM CHEST 2 VIEWS: CPT

## 2018-05-07 PROCEDURE — 63710000001 INSULIN ASPART PER 5 UNITS: Performed by: INTERNAL MEDICINE

## 2018-05-07 PROCEDURE — 85025 COMPLETE CBC W/AUTO DIFF WBC: CPT | Performed by: FAMILY MEDICINE

## 2018-05-07 PROCEDURE — 63710000001 INSULIN DETEMIR PER 5 UNITS: Performed by: FAMILY MEDICINE

## 2018-05-07 RX ORDER — LEVOFLOXACIN 250 MG/1
250 TABLET ORAL EVERY 24 HOURS
Status: DISCONTINUED | OUTPATIENT
Start: 2018-05-07 | End: 2018-05-08

## 2018-05-07 RX ADMIN — IPRATROPIUM BROMIDE AND ALBUTEROL SULFATE 3 ML: .5; 3 SOLUTION RESPIRATORY (INHALATION) at 10:50

## 2018-05-07 RX ADMIN — DULOXETINE HYDROCHLORIDE 20 MG: 20 CAPSULE, DELAYED RELEASE ORAL at 21:47

## 2018-05-07 RX ADMIN — NYSTATIN: 100000 POWDER TOPICAL at 21:47

## 2018-05-07 RX ADMIN — INSULIN ASPART 8 UNITS: 100 INJECTION, SOLUTION INTRAVENOUS; SUBCUTANEOUS at 17:20

## 2018-05-07 RX ADMIN — IPRATROPIUM BROMIDE AND ALBUTEROL SULFATE 3 ML: .5; 3 SOLUTION RESPIRATORY (INHALATION) at 19:49

## 2018-05-07 RX ADMIN — AMOXICILLIN AND CLAVULANATE POTASSIUM 500 MG: 500; 125 TABLET, FILM COATED ORAL at 21:47

## 2018-05-07 RX ADMIN — Medication: at 08:27

## 2018-05-07 RX ADMIN — LISINOPRIL 10 MG: 10 TABLET ORAL at 08:27

## 2018-05-07 RX ADMIN — Medication 10 ML: at 10:31

## 2018-05-07 RX ADMIN — POTASSIUM CHLORIDE 20 MEQ: 750 CAPSULE, EXTENDED RELEASE ORAL at 08:27

## 2018-05-07 RX ADMIN — AMOXICILLIN AND CLAVULANATE POTASSIUM 500 MG: 500; 125 TABLET, FILM COATED ORAL at 08:27

## 2018-05-07 RX ADMIN — LEVOFLOXACIN 250 MG: 250 TABLET, FILM COATED ORAL at 10:31

## 2018-05-07 RX ADMIN — NYSTATIN: 100000 POWDER TOPICAL at 08:27

## 2018-05-07 RX ADMIN — INSULIN ASPART 5 UNITS: 100 INJECTION, SOLUTION INTRAVENOUS; SUBCUTANEOUS at 21:47

## 2018-05-07 RX ADMIN — INSULIN DETEMIR 14 UNITS: 100 INJECTION, SOLUTION SUBCUTANEOUS at 21:46

## 2018-05-07 RX ADMIN — INSULIN ASPART 5 UNITS: 100 INJECTION, SOLUTION INTRAVENOUS; SUBCUTANEOUS at 11:05

## 2018-05-07 RX ADMIN — HYDROCORTISONE: 1 CREAM TOPICAL at 21:47

## 2018-05-07 RX ADMIN — Medication: at 21:47

## 2018-05-07 RX ADMIN — IPRATROPIUM BROMIDE AND ALBUTEROL SULFATE 3 ML: .5; 3 SOLUTION RESPIRATORY (INHALATION) at 14:58

## 2018-05-07 RX ADMIN — HYDROCORTISONE: 1 CREAM TOPICAL at 08:27

## 2018-05-07 RX ADMIN — ACETAMINOPHEN 650 MG: 325 TABLET ORAL at 17:23

## 2018-05-07 NOTE — PLAN OF CARE
Problem: Patient Care Overview  Goal: Plan of Care Review  Outcome: Ongoing (interventions implemented as appropriate)  Pt continues to scream when touched   05/06/18 0930 05/06/18 1526 05/07/18 0322   Coping/Psychosocial   Plan of Care Reviewed With --  --  patient   Plan of Care Review   Progress no change --  --    OTHER   Outcome Summary --  slow progress toward goals pt family stated pt only feeds self washes face hands at home . requires assist for all other tasks --      Goal: Individualization and Mutuality  Outcome: Ongoing (interventions implemented as appropriate)    Goal: Discharge Needs Assessment  Outcome: Ongoing (interventions implemented as appropriate)      Problem: Fall Risk (Adult)  Goal: Absence of Fall  Outcome: Ongoing (interventions implemented as appropriate)      Problem: Skin Injury Risk (Adult)  Goal: Skin Health and Integrity  Outcome: Ongoing (interventions implemented as appropriate)      Problem: Urinary Tract Infection (Adult)  Goal: Signs and Symptoms of Listed Potential Problems Will be Absent, Minimized or Managed (Urinary Tract Infection)  Outcome: Ongoing (interventions implemented as appropriate)

## 2018-05-07 NOTE — SIGNIFICANT NOTE
"   05/07/18 0746   Rehab Treatment   Discipline occupational therapy assistant   Reason Treatment Not Performed patient/family declined treatment, not feeling well  (Pt shake her head 'No' when asked to work with OT,family reports pt \"is not doing therapy today\")     "

## 2018-05-07 NOTE — CONSULTS
"Adult Nutrition  Assessment    Patient Name:  Tessy Gonzalez  YOB: 1940  MRN: [id number]  Admit Date:  4/29/2018    Assessment Date:  5/7/2018    Comments: Pt with improved po intake with pt eating ~42% average for the last 6 documented meals.  Poor acceptance of supplements, so I will d/c and add milk with meals.  No Gi distress noted.  Of note, increased energy needs with noted pressure injury.  RD will monitor.            Adult Nutrition Assessment     Row Name 05/07/18 1520       PO Evaluation    Number of Days PO Intake Evaluated 3 days    Number of Meals 6    % PO Intake 42%    Row Name 05/07/18 1519       Nutrition Prescription PO    Current PO Diet Soft Texture    Texture Ground    Fluid Consistency Thin    Supplement Other (comment)    Supplement Frequency Other (comment)   Preferences noted; high protein foods and beverages    Common Modifiers Consistent Carbohydrate    Row Name 05/07/18 1518       Labs/Procedures/Meds    Lab Results Reviewed reviewed, pertinent       Physical Findings    Skin pressure injury    Row Name 05/07/18 1517       Reason for Assessment    Reason For Assessment follow-up protocol       Nutrition/Diet History    Typical Food/Fluid Intake Pt sleeping.  spoke with daughters who report that pt is eating ''fair, about like she does at home\"  Denies any gi distress.  Does not like Glucerna but will drink milk          Electronically signed by:  Sera Fong RD  05/07/18 3:23 PM  "

## 2018-05-07 NOTE — PROGRESS NOTES
FAMILY MEDICINE PROGRESS NOTE  NAME: Tessy Gonazlez  : 1940  MRN: [id number]     LOS: 8 days   Conditional Code  PROVIDER OF SERVICE:     Chief Complaint:  Sepsis due to Klebsiella pneumoniae    Subjective     Interval History:  History taken from: patient chart RN  Subjective: Patient is a 76 yo  female who was admitted with septic shock.  She is sleepier today and not feeling well.    Review of Systems:   Review of Systems   Unable to perform ROS: Mental status change       Objective     Vital Signs  Temp:  [97.7 °F (36.5 °C)-99.9 °F (37.7 °C)] 99.9 °F (37.7 °C)  Heart Rate:  [67-84] 84  Resp:  [18] 18  BP: (112-154)/(55-67) 112/55    Physical Exam  Physical Exam   Constitutional: She appears well-developed and well-nourished. No distress.   Cardiovascular: Normal rate, regular rhythm, normal heart sounds and intact distal pulses.  Exam reveals no gallop and no friction rub.    No murmur heard.  Pulmonary/Chest: No respiratory distress. She has no wheezes. She has no rales.   Diminished breath sounds in the bases, poor inspiratory effort   Abdominal: Soft. Bowel sounds are normal. She exhibits no distension. There is no tenderness. There is no rebound and no guarding.   Musculoskeletal: She exhibits deformity.   Right lower leg lateral aspect large groove   Neurological: She is alert.   Skin: She is not diaphoretic.   Nursing note and vitals reviewed.      Medication Review    Current Facility-Administered Medications:   •  8-hydroxyquinoline sulfate (BAG BALM) ointment, , Apply externally, BID, Glo Kirk MD  •  acetaminophen (TYLENOL) tablet 650 mg, 650 mg, Oral, Q6H PRN, Jason Beckman MD, 650 mg at 18 1115  •  ALPRAZolam (XANAX) tablet 0.25 mg, 0.25 mg, Oral, Nightly PRN, Glo Kirk MD  •  amoxicillin-clavulanate (AUGMENTIN) 500-125 MG per tablet 500 mg, 1 tablet, Oral, Q12H, Glo Kirk MD, 500 mg at 18 0827  •  dextrose (D50W) solution 25 g, 25 g, Intravenous, Q15 Min  PRN, Jason Beckman MD, 25 g at 05/01/18 0503  •  dextrose (GLUTOSE) oral gel 15 g, 15 g, Oral, Q15 Min PRN, Jason Beckman MD  •  DULoxetine (CYMBALTA) DR capsule 20 mg, 20 mg, Oral, Nightly, Glo Kirk MD, 20 mg at 05/06/18 2030  •  enalaprilat (VASOTEC) injection 1.25 mg, 1.25 mg, Intravenous, Q6H PRN, Glo Kirk MD, 1.25 mg at 05/04/18 1155  •  glucagon (human recombinant) (GLUCAGEN DIAGNOSTIC) injection 1 mg, 1 mg, Subcutaneous, PRN, Jason Beckman MD  •  insulin aspart (novoLOG) injection 0-14 Units, 0-14 Units, Subcutaneous, 4x Daily AC & at Bedtime, Jason Beckman MD, 5 Units at 05/07/18 1105  •  insulin detemir (LEVEMIR) injection 14 Units, 14 Units, Subcutaneous, Nightly, Glo Kirk MD, 14 Units at 05/06/18 2029  •  ipratropium-albuterol (DUO-NEB) nebulizer solution 3 mL, 3 mL, Nebulization, 4x Daily - RT, Glo Kirk MD, 3 mL at 05/07/18 1050  •  levoFLOXacin (LEVAQUIN) tablet 250 mg, 250 mg, Oral, Q24H, Glo Kirk MD, 250 mg at 05/07/18 1031  •  lisinopril (PRINIVIL,ZESTRIL) tablet 10 mg, 10 mg, Oral, Q24H, Glo Kirk MD, 10 mg at 05/07/18 0827  •  magic butt ointment, , Topical, BID, Kulwidner Hernandez MD  •  morphine injection 1 mg, 1 mg, Intravenous, Q4H PRN, 1 mg at 05/03/18 1734 **AND** naloxone (NARCAN) injection 0.4 mg, 0.4 mg, Intravenous, Q5 Min PRN, Jason Beckman MD  •  nystatin (MYCOSTATIN) powder, , Topical, Q12H, Jason Beckman MD  •  potassium chloride (MICRO-K) CR capsule 20 mEq, 20 mEq, Oral, Daily, Glo Kirk MD, 20 mEq at 05/07/18 0805  •  sodium chloride 0.9 % flush 1-10 mL, 1-10 mL, Intravenous, PRN, Jason Beckman MD, 10 mL at 05/07/18 1031     Diagnostic Data      I reviewed the patient's new clinical results and imaging.      Assessment/Plan     Active Hospital Problems (** Indicates Principal Problem)    Diagnosis Date Noted   • **Sepsis due to Klebsiella pneumoniae [A41.4] 04/29/2018   • Hypokalemia [E87.6] 05/06/2018   • Onychomycosis of multiple toenails  with type 2 diabetes mellitus and peripheral neuropathy [E11.42, B35.1, E11.69] 05/06/2018     Nails trimmed     • Proctitis [K62.89] 05/05/2018   • Angina at rest [I20.8] 05/04/2018   • Dementia with behavioral disturbance [F03.91] 05/04/2018   • Left lower quadrant pain [R10.32] 05/04/2018   • Generalized anxiety disorder [F41.1] 05/04/2018   • Chronic pain syndrome [G89.4] 05/04/2018   • Acute on chronic renal failure [N17.9, N18.9] 04/30/2018   • Bacteremia due to Klebsiella pneumoniae [R78.81] 04/30/2018   • UTI (urinary tract infection) [N39.0] 04/29/2018   • Long-term insulin use in type 2 diabetes [E11.9, Z79.4] 04/29/2018   • Hypertension [I10] 04/29/2018   • Morbid obesity [E66.01]       Resolved Hospital Problems    Diagnosis Date Noted Date Resolved   • Septic shock [A41.9, R65.21] 04/29/2018 05/01/2018     Repeat chest xray for leukocytosis and lethargy.  Restart the Levaquin for bump in white count.  Hopefully discharge in the next day or so.    DVT prophylaxis: SCDs/TEDs      Disposition:Home with home health          This document has been electronically signed by Glo Kirk MD on May 7, 2018 1:23 PM

## 2018-05-07 NOTE — PLAN OF CARE
Problem: Patient Care Overview  Goal: Plan of Care Review  Outcome: Ongoing (interventions implemented as appropriate)   05/07/18 2785   Coping/Psychosocial   Plan of Care Reviewed With patient   Plan of Care Review   Progress no change   OTHER   Outcome Summary VSS, low grade temp, pt sleeping in between care.      Goal: Individualization and Mutuality  Outcome: Ongoing (interventions implemented as appropriate)    Goal: Discharge Needs Assessment  Outcome: Ongoing (interventions implemented as appropriate)    Goal: Interprofessional Rounds/Family Conf  Outcome: Ongoing (interventions implemented as appropriate)      Problem: Fall Risk (Adult)  Goal: Absence of Fall  Outcome: Ongoing (interventions implemented as appropriate)      Problem: Skin Injury Risk (Adult)  Goal: Skin Health and Integrity  Outcome: Ongoing (interventions implemented as appropriate)      Problem: Urinary Tract Infection (Adult)  Goal: Signs and Symptoms of Listed Potential Problems Will be Absent, Minimized or Managed (Urinary Tract Infection)  Outcome: Ongoing (interventions implemented as appropriate)      Problem: Pain, Chronic (Adult)  Goal: Acceptable Pain/Comfort Level and Functional Ability  Outcome: Ongoing (interventions implemented as appropriate)

## 2018-05-08 LAB
ALBUMIN SERPL-MCNC: 2.5 G/DL (ref 3.4–4.8)
ALBUMIN/GLOB SERPL: 0.7 G/DL (ref 1.1–1.8)
ALP SERPL-CCNC: 104 U/L (ref 38–126)
ALT SERPL W P-5'-P-CCNC: 15 U/L (ref 9–52)
ANION GAP SERPL CALCULATED.3IONS-SCNC: 5 MMOL/L (ref 5–15)
AST SERPL-CCNC: 16 U/L (ref 14–36)
BASOPHILS # BLD AUTO: 0.06 10*3/MM3 (ref 0–0.2)
BASOPHILS NFR BLD AUTO: 0.3 % (ref 0–2)
BILIRUB SERPL-MCNC: 0.5 MG/DL (ref 0.2–1.3)
BUN BLD-MCNC: 19 MG/DL (ref 7–21)
BUN/CREAT SERPL: 21.3 (ref 7–25)
CALCIUM SPEC-SCNC: 8.2 MG/DL (ref 8.4–10.2)
CHLORIDE SERPL-SCNC: 104 MMOL/L (ref 95–110)
CO2 SERPL-SCNC: 28 MMOL/L (ref 22–31)
CREAT BLD-MCNC: 0.89 MG/DL (ref 0.5–1)
DEPRECATED RDW RBC AUTO: 45.4 FL (ref 36.4–46.3)
EOSINOPHIL # BLD AUTO: 0.58 10*3/MM3 (ref 0–0.7)
EOSINOPHIL NFR BLD AUTO: 2.6 % (ref 0–7)
ERYTHROCYTE [DISTWIDTH] IN BLOOD BY AUTOMATED COUNT: 13.2 % (ref 11.5–14.5)
GFR SERPL CREATININE-BSD FRML MDRD: 62 ML/MIN/1.73 (ref 60–90)
GLOBULIN UR ELPH-MCNC: 3.8 GM/DL (ref 2.3–3.5)
GLUCOSE BLD-MCNC: 117 MG/DL (ref 60–100)
GLUCOSE BLDC GLUCOMTR-MCNC: 126 MG/DL (ref 70–130)
GLUCOSE BLDC GLUCOMTR-MCNC: 179 MG/DL (ref 70–130)
GLUCOSE BLDC GLUCOMTR-MCNC: 210 MG/DL (ref 70–130)
HCT VFR BLD AUTO: 34 % (ref 35–45)
HGB BLD-MCNC: 11 G/DL (ref 12–15.5)
IMM GRANULOCYTES # BLD: 0.45 10*3/MM3 (ref 0–0.02)
IMM GRANULOCYTES NFR BLD: 2 % (ref 0–0.5)
LYMPHOCYTES # BLD AUTO: 3.56 10*3/MM3 (ref 0.6–4.2)
LYMPHOCYTES NFR BLD AUTO: 16 % (ref 10–50)
MCH RBC QN AUTO: 30.1 PG (ref 26.5–34)
MCHC RBC AUTO-ENTMCNC: 32.4 G/DL (ref 31.4–36)
MCV RBC AUTO: 93.2 FL (ref 80–98)
MONOCYTES # BLD AUTO: 2.1 10*3/MM3 (ref 0–0.9)
MONOCYTES NFR BLD AUTO: 9.4 % (ref 0–12)
NEUTROPHILS # BLD AUTO: 15.49 10*3/MM3 (ref 2–8.6)
NEUTROPHILS NFR BLD AUTO: 69.7 % (ref 37–80)
NRBC BLD MANUAL-RTO: 0 /100 WBC (ref 0–0)
PLATELET # BLD AUTO: 255 10*3/MM3 (ref 150–450)
PMV BLD AUTO: 10.7 FL (ref 8–12)
POTASSIUM BLD-SCNC: 3.8 MMOL/L (ref 3.5–5.1)
PROT SERPL-MCNC: 6.3 G/DL (ref 6.3–8.6)
RBC # BLD AUTO: 3.65 10*6/MM3 (ref 3.77–5.16)
SODIUM BLD-SCNC: 137 MMOL/L (ref 137–145)
WBC NRBC COR # BLD: 22.24 10*3/MM3 (ref 3.2–9.8)

## 2018-05-08 PROCEDURE — 63710000001 INSULIN DETEMIR PER 5 UNITS: Performed by: FAMILY MEDICINE

## 2018-05-08 PROCEDURE — 85025 COMPLETE CBC W/AUTO DIFF WBC: CPT | Performed by: FAMILY MEDICINE

## 2018-05-08 PROCEDURE — 80053 COMPREHEN METABOLIC PANEL: CPT | Performed by: FAMILY MEDICINE

## 2018-05-08 PROCEDURE — 82962 GLUCOSE BLOOD TEST: CPT

## 2018-05-08 PROCEDURE — 99232 SBSQ HOSP IP/OBS MODERATE 35: CPT | Performed by: FAMILY MEDICINE

## 2018-05-08 PROCEDURE — 87086 URINE CULTURE/COLONY COUNT: CPT | Performed by: FAMILY MEDICINE

## 2018-05-08 PROCEDURE — 94799 UNLISTED PULMONARY SVC/PX: CPT

## 2018-05-08 PROCEDURE — 63710000001 INSULIN ASPART PER 5 UNITS: Performed by: INTERNAL MEDICINE

## 2018-05-08 PROCEDURE — 94760 N-INVAS EAR/PLS OXIMETRY 1: CPT

## 2018-05-08 PROCEDURE — 25010000002 PIPERACILLIN SOD-TAZOBACTAM PER 1 G: Performed by: FAMILY MEDICINE

## 2018-05-08 RX ORDER — LEVOFLOXACIN 500 MG/1
500 TABLET, FILM COATED ORAL EVERY 24 HOURS
Status: DISCONTINUED | OUTPATIENT
Start: 2018-05-09 | End: 2018-05-11 | Stop reason: HOSPADM

## 2018-05-08 RX ADMIN — NYSTATIN: 100000 POWDER TOPICAL at 08:24

## 2018-05-08 RX ADMIN — TAZOBACTAM SODIUM AND PIPERACILLIN SODIUM 3.38 G: 375; 3 INJECTION, SOLUTION INTRAVENOUS at 16:23

## 2018-05-08 RX ADMIN — NYSTATIN: 100000 POWDER TOPICAL at 21:53

## 2018-05-08 RX ADMIN — DULOXETINE HYDROCHLORIDE 20 MG: 20 CAPSULE, DELAYED RELEASE ORAL at 21:53

## 2018-05-08 RX ADMIN — INSULIN ASPART 3 UNITS: 100 INJECTION, SOLUTION INTRAVENOUS; SUBCUTANEOUS at 11:02

## 2018-05-08 RX ADMIN — HYDROCORTISONE: 1 CREAM TOPICAL at 08:24

## 2018-05-08 RX ADMIN — ACETAMINOPHEN 650 MG: 325 TABLET ORAL at 05:17

## 2018-05-08 RX ADMIN — ACETAMINOPHEN 650 MG: 325 TABLET ORAL at 18:06

## 2018-05-08 RX ADMIN — HYDROCORTISONE: 1 CREAM TOPICAL at 21:53

## 2018-05-08 RX ADMIN — Medication: at 08:31

## 2018-05-08 RX ADMIN — INSULIN ASPART 3 UNITS: 100 INJECTION, SOLUTION INTRAVENOUS; SUBCUTANEOUS at 21:00

## 2018-05-08 RX ADMIN — LISINOPRIL 10 MG: 10 TABLET ORAL at 08:24

## 2018-05-08 RX ADMIN — Medication: at 21:53

## 2018-05-08 RX ADMIN — TAZOBACTAM SODIUM AND PIPERACILLIN SODIUM 3.38 G: 375; 3 INJECTION, SOLUTION INTRAVENOUS at 09:20

## 2018-05-08 RX ADMIN — POTASSIUM CHLORIDE 20 MEQ: 750 CAPSULE, EXTENDED RELEASE ORAL at 08:24

## 2018-05-08 RX ADMIN — INSULIN ASPART 5 UNITS: 100 INJECTION, SOLUTION INTRAVENOUS; SUBCUTANEOUS at 18:01

## 2018-05-08 RX ADMIN — AMOXICILLIN AND CLAVULANATE POTASSIUM 500 MG: 500; 125 TABLET, FILM COATED ORAL at 08:24

## 2018-05-08 RX ADMIN — IPRATROPIUM BROMIDE AND ALBUTEROL SULFATE 3 ML: .5; 3 SOLUTION RESPIRATORY (INHALATION) at 11:33

## 2018-05-08 RX ADMIN — LEVOFLOXACIN 250 MG: 250 TABLET, FILM COATED ORAL at 08:31

## 2018-05-08 RX ADMIN — INSULIN DETEMIR 14 UNITS: 100 INJECTION, SOLUTION SUBCUTANEOUS at 21:52

## 2018-05-08 RX ADMIN — Medication: at 05:22

## 2018-05-08 NOTE — SIGNIFICANT NOTE
05/08/18 1326   Rehab Treatment   Discipline occupational therapy assistant   Reason Treatment Not Performed patient/family declined treatment, not feeling well  (Pt family member state pt is lethargic, states she does not understand why pt is getting therapy at all, explain to family benefits family states thats ridiculous and that pt is only getting worse ,thinks d/c from therapy should occur )

## 2018-05-08 NOTE — PLAN OF CARE
Problem: Patient Care Overview  Goal: Plan of Care Review  Outcome: Ongoing (interventions implemented as appropriate)   05/08/18 0352   Coping/Psychosocial   Plan of Care Reviewed With patient   Plan of Care Review   Progress no change   OTHER   Outcome Summary VSS, pt resting well throughout the night, continuing to scream in pain when she is touched      Goal: Individualization and Mutuality  Outcome: Ongoing (interventions implemented as appropriate)    Goal: Discharge Needs Assessment  Outcome: Ongoing (interventions implemented as appropriate)      Problem: Fall Risk (Adult)  Goal: Absence of Fall  Outcome: Ongoing (interventions implemented as appropriate)      Problem: Skin Injury Risk (Adult)  Goal: Skin Health and Integrity  Outcome: Ongoing (interventions implemented as appropriate)      Problem: Urinary Tract Infection (Adult)  Goal: Signs and Symptoms of Listed Potential Problems Will be Absent, Minimized or Managed (Urinary Tract Infection)  Outcome: Ongoing (interventions implemented as appropriate)      Problem: Pain, Chronic (Adult)  Goal: Acceptable Pain/Comfort Level and Functional Ability  Outcome: Ongoing (interventions implemented as appropriate)

## 2018-05-08 NOTE — PLAN OF CARE
Problem: Patient Care Overview  Goal: Plan of Care Review  Outcome: Ongoing (interventions implemented as appropriate)   05/08/18 1453   OTHER   Outcome Summary VS stable; zosyn started; urine culture sent; WBC inreased     Goal: Individualization and Mutuality  Outcome: Ongoing (interventions implemented as appropriate)    Goal: Discharge Needs Assessment  Outcome: Ongoing (interventions implemented as appropriate)    Goal: Interprofessional Rounds/Family Conf  Outcome: Ongoing (interventions implemented as appropriate)      Problem: Fall Risk (Adult)  Goal: Absence of Fall  Outcome: Ongoing (interventions implemented as appropriate)      Problem: Skin Injury Risk (Adult)  Goal: Skin Health and Integrity  Outcome: Ongoing (interventions implemented as appropriate)      Problem: Urinary Tract Infection (Adult)  Goal: Signs and Symptoms of Listed Potential Problems Will be Absent, Minimized or Managed (Urinary Tract Infection)  Outcome: Ongoing (interventions implemented as appropriate)      Problem: Pain, Chronic (Adult)  Goal: Acceptable Pain/Comfort Level and Functional Ability  Outcome: Ongoing (interventions implemented as appropriate)

## 2018-05-08 NOTE — THERAPY DISCHARGE NOTE
"Acute Care - Occupational Therapy Discharge Summary  [facility]     Patient Name: Tessy Gonzalez  : 1940  MRN: [id number]    Today's Date: 2018  Onset of Illness/Injury or Date of Surgery: 18    Date of Referral to OT: 18  Referring Physician: Dr. Glo Kirk      Admit Date: 2018    OT in room from 15:33 to 1535. Pt's dtr communicated that pt is able to feed herself and does not need skilled OT at this time. Pt's dtr stated pt is just sick right now. Pt's when asked if she wanted OT stated \"no\". Educated both on benefits of OT and did not want pt to loose independence with skills with grooming and self feeding she had before. Educated both to contact MD if anything changes and they want therapy again.   OTR/L spoke with Dr. Kirk at 15:48 regarding d/c.     OT Recommendation and Plan    Visit Dx:    ICD-10-CM ICD-9-CM   1. Impaired mobility and ADLs Z74.09 799.89   2. Impaired physical mobility Z74.09 781.99   3. Benign hypertensive heart disease without heart failure I11.9 402.10   4. Benign hypertension I10 401.1   5. Chest pain, unspecified type R07.9 786.50                     OT Rehab Goals     Row Name 18 1539             Grooming Goal 1 (OT)    Activity/Device (Grooming Goal 1, OT) oral care;wash face, hands  -      [place] (Grooming Goal 1, OT) set-up required;tactile cues required;verbal cues required;minimum assist (75% or more patient effort)  -      Time Frame (Grooming Goal 1, OT) long term goal (LTG);by discharge  -      Progress/Outcome (Grooming Goal 1, OT) goal not met  -         Self-Feeding Goal 1 (OT)    Activity/Assistive Device (Self-Feeding Goal 1, OT) self-feeding skills, all  -      [place] Level/Cues Needed (Self-Feeding Goal 1, OT) set-up required;supervision required;tactile cues required;verbal cues required  -      Time Frame (Self-Feeding Goal 1, OT) long term goal (LTG);by discharge  -      Progress/Outcomes " (Self-Feeding Goal 1, OT) goal met  -        User Key  (r) = Recorded By, (t) = Taken By, (c) = Cosigned By    Initials Name Provider Type Discipline     Rea Robbins, OTR/L Occupational Therapist OT                  OT Discharge Summary  Anticipated Discharge Disposition (OT): skilled nursing facility (SNF) (home with 24/7)  Reason for Discharge:  (family request d/c from OT, pt agreed to d/c)  Outcomes Achieved:  (1/3 goals met)  Discharge Destination:  (pt still a patient)      Rea Robbins OTR/L  5/8/2018

## 2018-05-09 ENCOUNTER — APPOINTMENT (OUTPATIENT)
Dept: ULTRASOUND IMAGING | Facility: HOSPITAL | Age: 78
End: 2018-05-09

## 2018-05-09 LAB
ALBUMIN SERPL-MCNC: 2.4 G/DL (ref 3.4–4.8)
ALBUMIN/GLOB SERPL: 0.6 G/DL (ref 1.1–1.8)
ALP SERPL-CCNC: 101 U/L (ref 38–126)
ALT SERPL W P-5'-P-CCNC: 11 U/L (ref 9–52)
ANION GAP SERPL CALCULATED.3IONS-SCNC: 5 MMOL/L (ref 5–15)
AST SERPL-CCNC: 20 U/L (ref 14–36)
BASOPHILS # BLD AUTO: 0.07 10*3/MM3 (ref 0–0.2)
BASOPHILS NFR BLD AUTO: 0.4 % (ref 0–2)
BILIRUB SERPL-MCNC: 0.6 MG/DL (ref 0.2–1.3)
BUN BLD-MCNC: 16 MG/DL (ref 7–21)
BUN/CREAT SERPL: 17.6 (ref 7–25)
CALCIUM SPEC-SCNC: 8.2 MG/DL (ref 8.4–10.2)
CHLORIDE SERPL-SCNC: 103 MMOL/L (ref 95–110)
CO2 SERPL-SCNC: 29 MMOL/L (ref 22–31)
CREAT BLD-MCNC: 0.91 MG/DL (ref 0.5–1)
DEPRECATED RDW RBC AUTO: 44.7 FL (ref 36.4–46.3)
EOSINOPHIL # BLD AUTO: 0.4 10*3/MM3 (ref 0–0.7)
EOSINOPHIL NFR BLD AUTO: 2.2 % (ref 0–7)
ERYTHROCYTE [DISTWIDTH] IN BLOOD BY AUTOMATED COUNT: 13 % (ref 11.5–14.5)
GFR SERPL CREATININE-BSD FRML MDRD: 60 ML/MIN/1.73 (ref 60–90)
GLOBULIN UR ELPH-MCNC: 3.9 GM/DL (ref 2.3–3.5)
GLUCOSE BLD-MCNC: 141 MG/DL (ref 60–100)
GLUCOSE BLDC GLUCOMTR-MCNC: 144 MG/DL (ref 70–130)
GLUCOSE BLDC GLUCOMTR-MCNC: 199 MG/DL (ref 70–130)
GLUCOSE BLDC GLUCOMTR-MCNC: 200 MG/DL (ref 70–130)
GLUCOSE BLDC GLUCOMTR-MCNC: 266 MG/DL (ref 70–130)
HCT VFR BLD AUTO: 33.5 % (ref 35–45)
HGB BLD-MCNC: 10.7 G/DL (ref 12–15.5)
IMM GRANULOCYTES # BLD: 0.17 10*3/MM3 (ref 0–0.02)
IMM GRANULOCYTES NFR BLD: 0.9 % (ref 0–0.5)
LYMPHOCYTES # BLD AUTO: 3.02 10*3/MM3 (ref 0.6–4.2)
LYMPHOCYTES NFR BLD AUTO: 16.7 % (ref 10–50)
MCH RBC QN AUTO: 29.9 PG (ref 26.5–34)
MCHC RBC AUTO-ENTMCNC: 31.9 G/DL (ref 31.4–36)
MCV RBC AUTO: 93.6 FL (ref 80–98)
MONOCYTES # BLD AUTO: 1.36 10*3/MM3 (ref 0–0.9)
MONOCYTES NFR BLD AUTO: 7.5 % (ref 0–12)
NEUTROPHILS # BLD AUTO: 13.1 10*3/MM3 (ref 2–8.6)
NEUTROPHILS NFR BLD AUTO: 72.3 % (ref 37–80)
NRBC BLD MANUAL-RTO: 0 /100 WBC (ref 0–0)
PLATELET # BLD AUTO: 356 10*3/MM3 (ref 150–450)
PMV BLD AUTO: 10.7 FL (ref 8–12)
POTASSIUM BLD-SCNC: 3.8 MMOL/L (ref 3.5–5.1)
PROT SERPL-MCNC: 6.3 G/DL (ref 6.3–8.6)
RBC # BLD AUTO: 3.58 10*6/MM3 (ref 3.77–5.16)
SODIUM BLD-SCNC: 137 MMOL/L (ref 137–145)
WBC NRBC COR # BLD: 18.12 10*3/MM3 (ref 3.2–9.8)

## 2018-05-09 PROCEDURE — 99232 SBSQ HOSP IP/OBS MODERATE 35: CPT | Performed by: FAMILY MEDICINE

## 2018-05-09 PROCEDURE — 82962 GLUCOSE BLOOD TEST: CPT

## 2018-05-09 PROCEDURE — 94799 UNLISTED PULMONARY SVC/PX: CPT

## 2018-05-09 PROCEDURE — 80053 COMPREHEN METABOLIC PANEL: CPT | Performed by: FAMILY MEDICINE

## 2018-05-09 PROCEDURE — 87040 BLOOD CULTURE FOR BACTERIA: CPT | Performed by: FAMILY MEDICINE

## 2018-05-09 PROCEDURE — 85025 COMPLETE CBC W/AUTO DIFF WBC: CPT | Performed by: FAMILY MEDICINE

## 2018-05-09 PROCEDURE — 76700 US EXAM ABDOM COMPLETE: CPT

## 2018-05-09 PROCEDURE — 63710000001 INSULIN DETEMIR PER 5 UNITS: Performed by: FAMILY MEDICINE

## 2018-05-09 PROCEDURE — 25010000002 PIPERACILLIN SOD-TAZOBACTAM PER 1 G: Performed by: FAMILY MEDICINE

## 2018-05-09 PROCEDURE — 94760 N-INVAS EAR/PLS OXIMETRY 1: CPT

## 2018-05-09 PROCEDURE — 63710000001 INSULIN ASPART PER 5 UNITS: Performed by: INTERNAL MEDICINE

## 2018-05-09 PROCEDURE — 25010000002 MORPHINE PER 10 MG: Performed by: INTERNAL MEDICINE

## 2018-05-09 RX ADMIN — HYDROCORTISONE: 1 CREAM TOPICAL at 09:31

## 2018-05-09 RX ADMIN — TAZOBACTAM SODIUM AND PIPERACILLIN SODIUM 3.38 G: 375; 3 INJECTION, SOLUTION INTRAVENOUS at 23:35

## 2018-05-09 RX ADMIN — NYSTATIN: 100000 POWDER TOPICAL at 09:37

## 2018-05-09 RX ADMIN — LEVOFLOXACIN 500 MG: 500 TABLET, FILM COATED ORAL at 09:31

## 2018-05-09 RX ADMIN — IPRATROPIUM BROMIDE AND ALBUTEROL SULFATE 3 ML: .5; 3 SOLUTION RESPIRATORY (INHALATION) at 07:15

## 2018-05-09 RX ADMIN — DULOXETINE HYDROCHLORIDE 20 MG: 20 CAPSULE, DELAYED RELEASE ORAL at 21:20

## 2018-05-09 RX ADMIN — TAZOBACTAM SODIUM AND PIPERACILLIN SODIUM 3.38 G: 375; 3 INJECTION, SOLUTION INTRAVENOUS at 00:15

## 2018-05-09 RX ADMIN — LISINOPRIL 10 MG: 10 TABLET ORAL at 09:31

## 2018-05-09 RX ADMIN — NYSTATIN: 100000 POWDER TOPICAL at 21:20

## 2018-05-09 RX ADMIN — INSULIN DETEMIR 14 UNITS: 100 INJECTION, SOLUTION SUBCUTANEOUS at 21:22

## 2018-05-09 RX ADMIN — IPRATROPIUM BROMIDE AND ALBUTEROL SULFATE 3 ML: .5; 3 SOLUTION RESPIRATORY (INHALATION) at 21:08

## 2018-05-09 RX ADMIN — TAZOBACTAM SODIUM AND PIPERACILLIN SODIUM 3.38 G: 375; 3 INJECTION, SOLUTION INTRAVENOUS at 15:56

## 2018-05-09 RX ADMIN — TAZOBACTAM SODIUM AND PIPERACILLIN SODIUM 3.38 G: 375; 3 INJECTION, SOLUTION INTRAVENOUS at 09:31

## 2018-05-09 RX ADMIN — MORPHINE SULFATE 1 MG: 4 INJECTION, SOLUTION INTRAMUSCULAR; INTRAVENOUS at 05:00

## 2018-05-09 RX ADMIN — Medication: at 21:20

## 2018-05-09 RX ADMIN — Medication: at 09:32

## 2018-05-09 RX ADMIN — INSULIN ASPART 8 UNITS: 100 INJECTION, SOLUTION INTRAVENOUS; SUBCUTANEOUS at 17:30

## 2018-05-09 RX ADMIN — POTASSIUM CHLORIDE 20 MEQ: 750 CAPSULE, EXTENDED RELEASE ORAL at 09:31

## 2018-05-09 RX ADMIN — IPRATROPIUM BROMIDE AND ALBUTEROL SULFATE 3 ML: .5; 3 SOLUTION RESPIRATORY (INHALATION) at 14:15

## 2018-05-09 RX ADMIN — IPRATROPIUM BROMIDE AND ALBUTEROL SULFATE 3 ML: .5; 3 SOLUTION RESPIRATORY (INHALATION) at 10:25

## 2018-05-09 RX ADMIN — INSULIN ASPART 5 UNITS: 100 INJECTION, SOLUTION INTRAVENOUS; SUBCUTANEOUS at 11:05

## 2018-05-09 RX ADMIN — INSULIN ASPART 5 UNITS: 100 INJECTION, SOLUTION INTRAVENOUS; SUBCUTANEOUS at 21:20

## 2018-05-09 RX ADMIN — HYDROCORTISONE: 1 CREAM TOPICAL at 21:20

## 2018-05-09 NOTE — PROGRESS NOTES
FAMILY MEDICINE PROGRESS NOTE  NAME: Tessy Gonzalez  : 1940  MRN: [id number]     LOS: 10 days   Conditional Code  PROVIDER OF SERVICE:     Chief Complaint:  Sepsis due to Klebsiella pneumoniae    Subjective     Interval History:  History taken from: patient chart RN  Subjective: Patient is a 76 yo  female who was admitted with septic shock.  She isn't eating much per family.    Review of Systems:   Review of Systems   Unable to perform ROS: Mental status change       Objective     Vital Signs  Temp:  [98.6 °F (37 °C)-100 °F (37.8 °C)] 99.6 °F (37.6 °C)  Heart Rate:  [60-75] 75  Resp:  [14-20] 16  BP: (134-150)/(60-72) 134/63    Physical Exam  Physical Exam   Constitutional: She appears well-developed and well-nourished. No distress.   Cardiovascular: Normal rate, regular rhythm, normal heart sounds and intact distal pulses.  Exam reveals no gallop and no friction rub.    No murmur heard.  Pulmonary/Chest: No respiratory distress. She has no wheezes. She has no rales.   Diminished breath sounds in the bases, poor inspiratory effort   Abdominal: Soft. Bowel sounds are normal. She exhibits no distension. There is no tenderness. There is no rebound and no guarding.   Musculoskeletal: She exhibits deformity.   Right lower leg lateral aspect large groove   Neurological: She is alert.   Skin: She is not diaphoretic.   Nursing note and vitals reviewed.      Medication Review    Current Facility-Administered Medications:   •  8-hydroxyquinoline sulfate (BAG BALM) ointment, , Apply externally, BID, Glo Kirk MD  •  acetaminophen (TYLENOL) tablet 650 mg, 650 mg, Oral, Q6H PRN, Jason Beckman MD, 650 mg at 18 1806  •  ALPRAZolam (XANAX) tablet 0.25 mg, 0.25 mg, Oral, Nightly PRN, Glo Kirk MD  •  dextrose (D50W) solution 25 g, 25 g, Intravenous, Q15 Min PRN, Jason Beckman MD, 25 g at 18 0503  •  dextrose (GLUTOSE) oral gel 15 g, 15 g, Oral, Q15 Min PRN, Jason Beckman MD  •  DULoxetine  (CYMBALTA) DR capsule 20 mg, 20 mg, Oral, Nightly, Glo Kirk MD, 20 mg at 05/08/18 2153  •  enalaprilat (VASOTEC) injection 1.25 mg, 1.25 mg, Intravenous, Q6H PRN, Glo Kirk MD, 1.25 mg at 05/04/18 1155  •  glucagon (human recombinant) (GLUCAGEN DIAGNOSTIC) injection 1 mg, 1 mg, Subcutaneous, PRN, Jason Beckman MD  •  insulin aspart (novoLOG) injection 0-14 Units, 0-14 Units, Subcutaneous, 4x Daily AC & at Bedtime, Jason Beckman MD, 8 Units at 05/09/18 1730  •  insulin detemir (LEVEMIR) injection 14 Units, 14 Units, Subcutaneous, Nightly, Glo Kirk MD, 14 Units at 05/08/18 2152  •  ipratropium-albuterol (DUO-NEB) nebulizer solution 3 mL, 3 mL, Nebulization, 4x Daily - RT, Glo Kirk MD, 3 mL at 05/09/18 1415  •  levoFLOXacin (LEVAQUIN) tablet 500 mg, 500 mg, Oral, Q24H, Glo Kirk MD, 500 mg at 05/09/18 0931  •  lisinopril (PRINIVIL,ZESTRIL) tablet 10 mg, 10 mg, Oral, Q24H, Glo Kirk MD, 10 mg at 05/09/18 0931  •  magic butt ointment, , Topical, BID, Kulwinder Hernandez MD  •  morphine injection 1 mg, 1 mg, Intravenous, Q4H PRN, 1 mg at 05/09/18 0500 **AND** naloxone (NARCAN) injection 0.4 mg, 0.4 mg, Intravenous, Q5 Min PRN, Jason Beckman MD  •  nystatin (MYCOSTATIN) powder, , Topical, Q12H, Jason Beckman MD  •  piperacillin-tazobactam (ZOSYN) 3.375 g in iso-osmotic dextrose 50 ml (premix), 3.375 g, Intravenous, Q8H, Glo Kirk MD, 3.375 g at 05/09/18 1556  •  potassium chloride (MICRO-K) CR capsule 20 mEq, 20 mEq, Oral, Daily, Glo Kirk MD, 20 mEq at 05/09/18 0931  •  sodium chloride 0.9 % flush 1-10 mL, 1-10 mL, Intravenous, PRN, Jason Beckman MD, 10 mL at 05/07/18 1031     Diagnostic Data      I reviewed the patient's new clinical results and imaging.      Assessment/Plan     Active Hospital Problems (** Indicates Principal Problem)    Diagnosis Date Noted   • **Sepsis due to Klebsiella pneumoniae [A41.4] 04/29/2018   • Hypokalemia [E87.6] 05/06/2018   • Onychomycosis of  multiple toenails with type 2 diabetes mellitus and peripheral neuropathy [E11.42, B35.1, E11.69] 05/06/2018     Nails trimmed     • Proctitis [K62.89] 05/05/2018   • Angina at rest [I20.8] 05/04/2018   • Dementia with behavioral disturbance [F03.91] 05/04/2018   • Left lower quadrant pain [R10.32] 05/04/2018   • Generalized anxiety disorder [F41.1] 05/04/2018   • Chronic pain syndrome [G89.4] 05/04/2018   • Acute on chronic renal failure [N17.9, N18.9] 04/30/2018   • Bacteremia due to Klebsiella pneumoniae [R78.81] 04/30/2018   • UTI (urinary tract infection) [N39.0] 04/29/2018   • Long-term insulin use in type 2 diabetes [E11.9, Z79.4] 04/29/2018   • Hypertension [I10] 04/29/2018   • Morbid obesity [E66.01]       Resolved Hospital Problems    Diagnosis Date Noted Date Resolved   • Septic shock [A41.9, R65.21] 04/29/2018 05/01/2018     Ultrasound of the abdomen pending.  Cultures pending.    DVT prophylaxis: SCDs/TEDs      Disposition:Home with home health          This document has been electronically signed by Glo Kirk MD on May 9, 2018 5:39 PM

## 2018-05-09 NOTE — NURSING NOTE
While doing cantu care during a bath this morning, the nurse aid lifted cantu to clean underneath and the catheter came out, bulb intact. Pt did not verbalize any discomfort during this time and the cantu was reinserted.

## 2018-05-09 NOTE — PROGRESS NOTES
FAMILY MEDICINE PROGRESS NOTE  NAME: Tessy Gonzalez  : 1940  MRN: [id number]     LOS: 9 days   Conditional Code  PROVIDER OF SERVICE:     Chief Complaint:  Sepsis due to Klebsiella pneumoniae    Subjective     Interval History:  History taken from: patient chart RN  Subjective: Patient is a 76 yo  female who was admitted with septic shock.  She has given up per family.    Review of Systems:   Review of Systems   Unable to perform ROS: Mental status change       Objective     Vital Signs  Temp:  [98.2 °F (36.8 °C)-98.6 °F (37 °C)] 98.2 °F (36.8 °C)  Heart Rate:  [60-80] 71  Resp:  [16-20] 20  BP: (110-150)/(50-70) 124/70    Physical Exam  Physical Exam   Constitutional: She appears well-developed and well-nourished. No distress.   Cardiovascular: Normal rate, regular rhythm, normal heart sounds and intact distal pulses.  Exam reveals no gallop and no friction rub.    No murmur heard.  Pulmonary/Chest: No respiratory distress. She has no wheezes. She has no rales.   Diminished breath sounds in the bases, poor inspiratory effort   Abdominal: Soft. Bowel sounds are normal. She exhibits no distension. There is no tenderness. There is no rebound and no guarding.   Musculoskeletal: She exhibits deformity.   Right lower leg lateral aspect large groove   Neurological: She is alert.   Skin: She is not diaphoretic.   Nursing note and vitals reviewed.      Medication Review    Current Facility-Administered Medications:   •  8-hydroxyquinoline sulfate (BAG BALM) ointment, , Apply externally, BID, Glo Kirk MD  •  acetaminophen (TYLENOL) tablet 650 mg, 650 mg, Oral, Q6H PRN, Jason Beckman MD, 650 mg at 18 1806  •  ALPRAZolam (XANAX) tablet 0.25 mg, 0.25 mg, Oral, Nightly PRN, Glo Kirk MD  •  dextrose (D50W) solution 25 g, 25 g, Intravenous, Q15 Min PRN, Jason Beckman MD, 25 g at 18 0503  •  dextrose (GLUTOSE) oral gel 15 g, 15 g, Oral, Q15 Min PRN, Jason Beckman MD  •  DULoxetine  (CYMBALTA) DR capsule 20 mg, 20 mg, Oral, Nightly, Glo Kirk MD, 20 mg at 05/08/18 2153  •  enalaprilat (VASOTEC) injection 1.25 mg, 1.25 mg, Intravenous, Q6H PRN, Glo Kirk MD, 1.25 mg at 05/04/18 1155  •  glucagon (human recombinant) (GLUCAGEN DIAGNOSTIC) injection 1 mg, 1 mg, Subcutaneous, PRN, Jason Beckman MD  •  insulin aspart (novoLOG) injection 0-14 Units, 0-14 Units, Subcutaneous, 4x Daily AC & at Bedtime, Jason Beckman MD, 3 Units at 05/08/18 2100  •  insulin detemir (LEVEMIR) injection 14 Units, 14 Units, Subcutaneous, Nightly, Glo Kirk MD, 14 Units at 05/08/18 2152  •  ipratropium-albuterol (DUO-NEB) nebulizer solution 3 mL, 3 mL, Nebulization, 4x Daily - RT, Glo Kirk MD, 3 mL at 05/08/18 1133  •  [START ON 5/9/2018] levoFLOXacin (LEVAQUIN) tablet 500 mg, 500 mg, Oral, Q24H, Glo Kirk MD  •  lisinopril (PRINIVIL,ZESTRIL) tablet 10 mg, 10 mg, Oral, Q24H, Glo Kirk MD, 10 mg at 05/08/18 0824  •  magic butt ointment, , Topical, BID, Kulwinder Hernandez MD  •  morphine injection 1 mg, 1 mg, Intravenous, Q4H PRN, 1 mg at 05/03/18 1734 **AND** naloxone (NARCAN) injection 0.4 mg, 0.4 mg, Intravenous, Q5 Min PRN, Jason Beckman MD  •  nystatin (MYCOSTATIN) powder, , Topical, Q12H, Jason Beckman MD  •  piperacillin-tazobactam (ZOSYN) 3.375 g in iso-osmotic dextrose 50 ml (premix), 3.375 g, Intravenous, Q8H, Glo Kirk MD, 3.375 g at 05/08/18 1623  •  potassium chloride (MICRO-K) CR capsule 20 mEq, 20 mEq, Oral, Daily, Glo Kirk MD, 20 mEq at 05/08/18 0824  •  sodium chloride 0.9 % flush 1-10 mL, 1-10 mL, Intravenous, PRN, Jason Beckman MD, 10 mL at 05/07/18 1031     Diagnostic Data      I reviewed the patient's new clinical results and imaging.      Assessment/Plan     Active Hospital Problems (** Indicates Principal Problem)    Diagnosis Date Noted   • **Sepsis due to Klebsiella pneumoniae [A41.4] 04/29/2018   • Hypokalemia [E87.6] 05/06/2018   • Onychomycosis of multiple  toenails with type 2 diabetes mellitus and peripheral neuropathy [E11.42, B35.1, E11.69] 05/06/2018     Nails trimmed     • Proctitis [K62.89] 05/05/2018   • Angina at rest [I20.8] 05/04/2018   • Dementia with behavioral disturbance [F03.91] 05/04/2018   • Left lower quadrant pain [R10.32] 05/04/2018   • Generalized anxiety disorder [F41.1] 05/04/2018   • Chronic pain syndrome [G89.4] 05/04/2018   • Acute on chronic renal failure [N17.9, N18.9] 04/30/2018   • Bacteremia due to Klebsiella pneumoniae [R78.81] 04/30/2018   • UTI (urinary tract infection) [N39.0] 04/29/2018   • Long-term insulin use in type 2 diabetes [E11.9, Z79.4] 04/29/2018   • Hypertension [I10] 04/29/2018   • Morbid obesity [E66.01]       Resolved Hospital Problems    Diagnosis Date Noted Date Resolved   • Septic shock [A41.9, R65.21] 04/29/2018 05/01/2018     Ultrasound of the abdomen for leukocytosis and restart Zosyn.  Reculture the urine.    DVT prophylaxis: SCDs/TEDs      Disposition:Home with home health          This document has been electronically signed by Glo Kirk MD on May 8, 2018 10:14 PM

## 2018-05-09 NOTE — PLAN OF CARE
Problem: Patient Care Overview  Goal: Plan of Care Review  Outcome: Ongoing (interventions implemented as appropriate)   05/09/18 0528   Coping/Psychosocial   Plan of Care Reviewed With patient   Plan of Care Review   Progress no change     Goal: Individualization and Mutuality  Outcome: Ongoing (interventions implemented as appropriate)    Goal: Discharge Needs Assessment  Outcome: Ongoing (interventions implemented as appropriate)   04/29/18 2336 05/09/18 0528   Discharge Needs Assessment   Readmission Within the Last 30 Days --  no previous admission in last 30 days   Concerns to be Addressed --  no discharge needs identified   Patient/Family Anticipates Transition to --  home with help/services   Disability   Equipment Currently Used at Home commode --      Goal: Interprofessional Rounds/Family Conf  Outcome: Ongoing (interventions implemented as appropriate)   05/09/18 0528   Interdisciplinary Rounds/Family Conf   Participants nursing       Problem: Fall Risk (Adult)  Goal: Absence of Fall  Outcome: Ongoing (interventions implemented as appropriate)   05/09/18 0528   Fall Risk (Adult)   Absence of Fall making progress toward outcome       Problem: Skin Injury Risk (Adult)  Goal: Skin Health and Integrity  Outcome: Ongoing (interventions implemented as appropriate)   05/09/18 0528   Skin Injury Risk (Adult)   Skin Health and Integrity making progress toward outcome       Problem: Urinary Tract Infection (Adult)  Goal: Signs and Symptoms of Listed Potential Problems Will be Absent, Minimized or Managed (Urinary Tract Infection)  Outcome: Ongoing (interventions implemented as appropriate)   05/09/18 0528   Goal/Outcome Evaluation   Problems Assessed (Urinary Tract Infection) all   Problems Present (UTI) pain       Problem: Pain, Chronic (Adult)  Goal: Acceptable Pain/Comfort Level and Functional Ability  Outcome: Ongoing (interventions implemented as appropriate)   05/09/18 0528   Pain, Chronic (Adult)    Acceptable Pain/Comfort Level and Functional Ability making progress toward outcome

## 2018-05-09 NOTE — PLAN OF CARE
Problem: Patient Care Overview  Goal: Plan of Care Review  Outcome: Ongoing (interventions implemented as appropriate)   05/09/18 4982   Coping/Psychosocial   Plan of Care Reviewed With patient   Plan of Care Review   Progress no change   OTHER   Outcome Summary pt had cantu catheter come out with bulb intact. reinserted with no distress. pt is still screaming when being touched. had bm today. still on IV abx     Goal: Individualization and Mutuality  Outcome: Ongoing (interventions implemented as appropriate)    Goal: Discharge Needs Assessment  Outcome: Ongoing (interventions implemented as appropriate)      Problem: Fall Risk (Adult)  Goal: Absence of Fall  Outcome: Ongoing (interventions implemented as appropriate)      Problem: Skin Injury Risk (Adult)  Goal: Skin Health and Integrity  Outcome: Ongoing (interventions implemented as appropriate)      Problem: Urinary Tract Infection (Adult)  Goal: Signs and Symptoms of Listed Potential Problems Will be Absent, Minimized or Managed (Urinary Tract Infection)  Outcome: Ongoing (interventions implemented as appropriate)      Problem: Pain, Chronic (Adult)  Goal: Acceptable Pain/Comfort Level and Functional Ability  Outcome: Ongoing (interventions implemented as appropriate)

## 2018-05-10 PROBLEM — K80.20 ASYMPTOMATIC GALLSTONES: Status: ACTIVE | Noted: 2018-05-10

## 2018-05-10 LAB
ALBUMIN SERPL-MCNC: 2.4 G/DL (ref 3.4–4.8)
ALBUMIN/GLOB SERPL: 0.6 G/DL (ref 1.1–1.8)
ALP SERPL-CCNC: 79 U/L (ref 38–126)
ALT SERPL W P-5'-P-CCNC: 18 U/L (ref 9–52)
ANION GAP SERPL CALCULATED.3IONS-SCNC: 7 MMOL/L (ref 5–15)
AST SERPL-CCNC: 23 U/L (ref 14–36)
BACTERIA SPEC AEROBE CULT: NORMAL
BASOPHILS # BLD AUTO: 0.07 10*3/MM3 (ref 0–0.2)
BASOPHILS NFR BLD AUTO: 0.4 % (ref 0–2)
BILIRUB SERPL-MCNC: 0.4 MG/DL (ref 0.2–1.3)
BUN BLD-MCNC: 15 MG/DL (ref 7–21)
BUN/CREAT SERPL: 15.5 (ref 7–25)
CALCIUM SPEC-SCNC: 7.5 MG/DL (ref 8.4–10.2)
CHLORIDE SERPL-SCNC: 103 MMOL/L (ref 95–110)
CO2 SERPL-SCNC: 26 MMOL/L (ref 22–31)
CREAT BLD-MCNC: 0.97 MG/DL (ref 0.5–1)
DEPRECATED RDW RBC AUTO: 44.5 FL (ref 36.4–46.3)
EOSINOPHIL # BLD AUTO: 0.28 10*3/MM3 (ref 0–0.7)
EOSINOPHIL NFR BLD AUTO: 1.6 % (ref 0–7)
ERYTHROCYTE [DISTWIDTH] IN BLOOD BY AUTOMATED COUNT: 13 % (ref 11.5–14.5)
GFR SERPL CREATININE-BSD FRML MDRD: 56 ML/MIN/1.73 (ref 39–90)
GLOBULIN UR ELPH-MCNC: 3.7 GM/DL (ref 2.3–3.5)
GLUCOSE BLD-MCNC: 124 MG/DL (ref 60–100)
GLUCOSE BLDC GLUCOMTR-MCNC: 133 MG/DL (ref 70–130)
GLUCOSE BLDC GLUCOMTR-MCNC: 213 MG/DL (ref 70–130)
GLUCOSE BLDC GLUCOMTR-MCNC: 226 MG/DL (ref 70–130)
GLUCOSE BLDC GLUCOMTR-MCNC: 273 MG/DL (ref 70–130)
GLUCOSE BLDC GLUCOMTR-MCNC: 290 MG/DL (ref 70–130)
HCT VFR BLD AUTO: 30.5 % (ref 35–45)
HGB BLD-MCNC: 9.7 G/DL (ref 12–15.5)
HOLD SPECIMEN: NORMAL
IMM GRANULOCYTES # BLD: 0.21 10*3/MM3 (ref 0–0.02)
IMM GRANULOCYTES NFR BLD: 1.2 % (ref 0–0.5)
LYMPHOCYTES # BLD AUTO: 2.94 10*3/MM3 (ref 0.6–4.2)
LYMPHOCYTES NFR BLD AUTO: 17 % (ref 10–50)
MCH RBC QN AUTO: 29.8 PG (ref 26.5–34)
MCHC RBC AUTO-ENTMCNC: 31.8 G/DL (ref 31.4–36)
MCV RBC AUTO: 93.6 FL (ref 80–98)
MONOCYTES # BLD AUTO: 1.47 10*3/MM3 (ref 0–0.9)
MONOCYTES NFR BLD AUTO: 8.5 % (ref 0–12)
NEUTROPHILS # BLD AUTO: 12.28 10*3/MM3 (ref 2–8.6)
NEUTROPHILS NFR BLD AUTO: 71.3 % (ref 37–80)
NRBC BLD MANUAL-RTO: 0 /100 WBC (ref 0–0)
PLATELET # BLD AUTO: 306 10*3/MM3 (ref 150–450)
PMV BLD AUTO: 11.2 FL (ref 8–12)
POTASSIUM BLD-SCNC: 3.7 MMOL/L (ref 3.5–5.1)
PROT SERPL-MCNC: 6.1 G/DL (ref 6.3–8.6)
RBC # BLD AUTO: 3.26 10*6/MM3 (ref 3.77–5.16)
SODIUM BLD-SCNC: 136 MMOL/L (ref 137–145)
WBC NRBC COR # BLD: 17.25 10*3/MM3 (ref 3.2–9.8)
WHOLE BLOOD HOLD SPECIMEN: NORMAL

## 2018-05-10 PROCEDURE — 63710000001 INSULIN DETEMIR PER 5 UNITS: Performed by: FAMILY MEDICINE

## 2018-05-10 PROCEDURE — 82962 GLUCOSE BLOOD TEST: CPT

## 2018-05-10 PROCEDURE — 85025 COMPLETE CBC W/AUTO DIFF WBC: CPT | Performed by: FAMILY MEDICINE

## 2018-05-10 PROCEDURE — 63710000001 INSULIN ASPART PER 5 UNITS: Performed by: INTERNAL MEDICINE

## 2018-05-10 PROCEDURE — 94760 N-INVAS EAR/PLS OXIMETRY 1: CPT

## 2018-05-10 PROCEDURE — 94799 UNLISTED PULMONARY SVC/PX: CPT

## 2018-05-10 PROCEDURE — 99232 SBSQ HOSP IP/OBS MODERATE 35: CPT | Performed by: FAMILY MEDICINE

## 2018-05-10 PROCEDURE — 25010000002 MORPHINE PER 10 MG: Performed by: INTERNAL MEDICINE

## 2018-05-10 PROCEDURE — 25010000002 PIPERACILLIN SOD-TAZOBACTAM PER 1 G: Performed by: FAMILY MEDICINE

## 2018-05-10 PROCEDURE — 80053 COMPREHEN METABOLIC PANEL: CPT | Performed by: FAMILY MEDICINE

## 2018-05-10 RX ORDER — MIRTAZAPINE 15 MG/1
15 TABLET, FILM COATED ORAL NIGHTLY
Status: DISCONTINUED | OUTPATIENT
Start: 2018-05-10 | End: 2018-05-11 | Stop reason: HOSPADM

## 2018-05-10 RX ADMIN — MORPHINE SULFATE 1 MG: 4 INJECTION, SOLUTION INTRAMUSCULAR; INTRAVENOUS at 00:58

## 2018-05-10 RX ADMIN — IPRATROPIUM BROMIDE AND ALBUTEROL SULFATE 3 ML: .5; 3 SOLUTION RESPIRATORY (INHALATION) at 08:55

## 2018-05-10 RX ADMIN — IPRATROPIUM BROMIDE AND ALBUTEROL SULFATE 3 ML: .5; 3 SOLUTION RESPIRATORY (INHALATION) at 15:24

## 2018-05-10 RX ADMIN — TAZOBACTAM SODIUM AND PIPERACILLIN SODIUM 3.38 G: 375; 3 INJECTION, SOLUTION INTRAVENOUS at 15:13

## 2018-05-10 RX ADMIN — MIRTAZAPINE 15 MG: 15 TABLET, FILM COATED ORAL at 22:55

## 2018-05-10 RX ADMIN — Medication: at 09:14

## 2018-05-10 RX ADMIN — INSULIN ASPART 8 UNITS: 100 INJECTION, SOLUTION INTRAVENOUS; SUBCUTANEOUS at 17:07

## 2018-05-10 RX ADMIN — HYDROCORTISONE: 1 CREAM TOPICAL at 22:54

## 2018-05-10 RX ADMIN — INSULIN ASPART 5 UNITS: 100 INJECTION, SOLUTION INTRAVENOUS; SUBCUTANEOUS at 11:31

## 2018-05-10 RX ADMIN — LEVOFLOXACIN 500 MG: 500 TABLET, FILM COATED ORAL at 09:09

## 2018-05-10 RX ADMIN — INSULIN ASPART 8 UNITS: 100 INJECTION, SOLUTION INTRAVENOUS; SUBCUTANEOUS at 22:55

## 2018-05-10 RX ADMIN — NYSTATIN: 100000 POWDER TOPICAL at 22:56

## 2018-05-10 RX ADMIN — TAZOBACTAM SODIUM AND PIPERACILLIN SODIUM 3.38 G: 375; 3 INJECTION, SOLUTION INTRAVENOUS at 06:18

## 2018-05-10 RX ADMIN — POTASSIUM CHLORIDE 20 MEQ: 750 CAPSULE, EXTENDED RELEASE ORAL at 09:09

## 2018-05-10 RX ADMIN — INSULIN DETEMIR 14 UNITS: 100 INJECTION, SOLUTION SUBCUTANEOUS at 22:54

## 2018-05-10 RX ADMIN — Medication: at 22:55

## 2018-05-10 RX ADMIN — IPRATROPIUM BROMIDE AND ALBUTEROL SULFATE 3 ML: .5; 3 SOLUTION RESPIRATORY (INHALATION) at 11:52

## 2018-05-10 RX ADMIN — HYDROCORTISONE: 1 CREAM TOPICAL at 09:14

## 2018-05-10 RX ADMIN — TAZOBACTAM SODIUM AND PIPERACILLIN SODIUM 3.38 G: 375; 3 INJECTION, SOLUTION INTRAVENOUS at 22:55

## 2018-05-10 RX ADMIN — LISINOPRIL 10 MG: 10 TABLET ORAL at 09:09

## 2018-05-10 RX ADMIN — NYSTATIN: 100000 POWDER TOPICAL at 09:14

## 2018-05-10 NOTE — PLAN OF CARE
Problem: Patient Care Overview  Goal: Plan of Care Review  Outcome: Ongoing (interventions implemented as appropriate)   05/10/18 1222   Coping/Psychosocial   Plan of Care Reviewed With patient;daughter   Plan of Care Review   Progress improving   OTHER   Outcome Summary pt wants to go home. she still does not like to be moved or touched.        Problem: Fall Risk (Adult)  Goal: Absence of Fall  Outcome: Ongoing (interventions implemented as appropriate)      Problem: Skin Injury Risk (Adult)  Goal: Skin Health and Integrity  Outcome: Ongoing (interventions implemented as appropriate)      Problem: Urinary Tract Infection (Adult)  Goal: Signs and Symptoms of Listed Potential Problems Will be Absent, Minimized or Managed (Urinary Tract Infection)  Outcome: Ongoing (interventions implemented as appropriate)      Problem: Pain, Chronic (Adult)  Goal: Acceptable Pain/Comfort Level and Functional Ability  Outcome: Ongoing (interventions implemented as appropriate)

## 2018-05-11 VITALS
RESPIRATION RATE: 18 BRPM | WEIGHT: 246.47 LBS | BODY MASS INDEX: 39.61 KG/M2 | SYSTOLIC BLOOD PRESSURE: 140 MMHG | OXYGEN SATURATION: 95 % | HEART RATE: 83 BPM | TEMPERATURE: 98.6 F | DIASTOLIC BLOOD PRESSURE: 68 MMHG | HEIGHT: 66 IN

## 2018-05-11 PROBLEM — I20.8 ANGINA AT REST (HCC): Status: RESOLVED | Noted: 2018-05-04 | Resolved: 2018-05-11

## 2018-05-11 PROBLEM — E87.6 HYPOKALEMIA: Status: RESOLVED | Noted: 2018-05-06 | Resolved: 2018-05-11

## 2018-05-11 PROBLEM — A41.4 SEPSIS DUE TO KLEBSIELLA PNEUMONIAE (HCC): Status: RESOLVED | Noted: 2018-04-29 | Resolved: 2018-05-11

## 2018-05-11 PROBLEM — R78.81 BACTEREMIA DUE TO KLEBSIELLA PNEUMONIAE: Status: RESOLVED | Noted: 2018-04-30 | Resolved: 2018-05-11

## 2018-05-11 PROBLEM — B96.1 BACTEREMIA DUE TO KLEBSIELLA PNEUMONIAE: Status: RESOLVED | Noted: 2018-04-30 | Resolved: 2018-05-11

## 2018-05-11 LAB
ALBUMIN SERPL-MCNC: 2.6 G/DL (ref 3.4–4.8)
ALBUMIN/GLOB SERPL: 0.7 G/DL (ref 1.1–1.8)
ALP SERPL-CCNC: 79 U/L (ref 38–126)
ALT SERPL W P-5'-P-CCNC: 21 U/L (ref 9–52)
ANION GAP SERPL CALCULATED.3IONS-SCNC: 9 MMOL/L (ref 5–15)
AST SERPL-CCNC: 13 U/L (ref 14–36)
BASOPHILS # BLD AUTO: 0.08 10*3/MM3 (ref 0–0.2)
BASOPHILS NFR BLD AUTO: 0.5 % (ref 0–2)
BILIRUB SERPL-MCNC: 0.4 MG/DL (ref 0.2–1.3)
BUN BLD-MCNC: 14 MG/DL (ref 7–21)
BUN/CREAT SERPL: 13.7 (ref 7–25)
CALCIUM SPEC-SCNC: 8 MG/DL (ref 8.4–10.2)
CHLORIDE SERPL-SCNC: 104 MMOL/L (ref 95–110)
CO2 SERPL-SCNC: 27 MMOL/L (ref 22–31)
CREAT BLD-MCNC: 1.02 MG/DL (ref 0.5–1)
DEPRECATED RDW RBC AUTO: 45.2 FL (ref 36.4–46.3)
EOSINOPHIL # BLD AUTO: 0.47 10*3/MM3 (ref 0–0.7)
EOSINOPHIL NFR BLD AUTO: 2.7 % (ref 0–7)
ERYTHROCYTE [DISTWIDTH] IN BLOOD BY AUTOMATED COUNT: 13.1 % (ref 11.5–14.5)
GFR SERPL CREATININE-BSD FRML MDRD: 53 ML/MIN/1.73 (ref 39–90)
GLOBULIN UR ELPH-MCNC: 3.6 GM/DL (ref 2.3–3.5)
GLUCOSE BLD-MCNC: 151 MG/DL (ref 60–100)
GLUCOSE BLDC GLUCOMTR-MCNC: 142 MG/DL (ref 70–130)
GLUCOSE BLDC GLUCOMTR-MCNC: 163 MG/DL (ref 70–130)
GLUCOSE BLDC GLUCOMTR-MCNC: 248 MG/DL (ref 70–130)
HCT VFR BLD AUTO: 33.6 % (ref 35–45)
HGB BLD-MCNC: 10.7 G/DL (ref 12–15.5)
IMM GRANULOCYTES # BLD: 0.16 10*3/MM3 (ref 0–0.02)
IMM GRANULOCYTES NFR BLD: 0.9 % (ref 0–0.5)
LYMPHOCYTES # BLD AUTO: 4.2 10*3/MM3 (ref 0.6–4.2)
LYMPHOCYTES NFR BLD AUTO: 23.7 % (ref 10–50)
MCH RBC QN AUTO: 29.8 PG (ref 26.5–34)
MCHC RBC AUTO-ENTMCNC: 31.8 G/DL (ref 31.4–36)
MCV RBC AUTO: 93.6 FL (ref 80–98)
MONOCYTES # BLD AUTO: 1.65 10*3/MM3 (ref 0–0.9)
MONOCYTES NFR BLD AUTO: 9.3 % (ref 0–12)
NEUTROPHILS # BLD AUTO: 11.17 10*3/MM3 (ref 2–8.6)
NEUTROPHILS NFR BLD AUTO: 62.9 % (ref 37–80)
NRBC BLD MANUAL-RTO: 0 /100 WBC (ref 0–0)
PLATELET # BLD AUTO: 245 10*3/MM3 (ref 150–450)
PMV BLD AUTO: 11.1 FL (ref 8–12)
POTASSIUM BLD-SCNC: 3.9 MMOL/L (ref 3.5–5.1)
PROT SERPL-MCNC: 6.2 G/DL (ref 6.3–8.6)
RBC # BLD AUTO: 3.59 10*6/MM3 (ref 3.77–5.16)
SODIUM BLD-SCNC: 140 MMOL/L (ref 137–145)
WBC NRBC COR # BLD: 17.73 10*3/MM3 (ref 3.2–9.8)

## 2018-05-11 PROCEDURE — 94760 N-INVAS EAR/PLS OXIMETRY 1: CPT

## 2018-05-11 PROCEDURE — 25010000002 PIPERACILLIN SOD-TAZOBACTAM PER 1 G: Performed by: FAMILY MEDICINE

## 2018-05-11 PROCEDURE — 82962 GLUCOSE BLOOD TEST: CPT

## 2018-05-11 PROCEDURE — 94799 UNLISTED PULMONARY SVC/PX: CPT

## 2018-05-11 PROCEDURE — 63710000001 INSULIN ASPART PER 5 UNITS: Performed by: INTERNAL MEDICINE

## 2018-05-11 PROCEDURE — 85025 COMPLETE CBC W/AUTO DIFF WBC: CPT | Performed by: FAMILY MEDICINE

## 2018-05-11 PROCEDURE — 80053 COMPREHEN METABOLIC PANEL: CPT | Performed by: FAMILY MEDICINE

## 2018-05-11 PROCEDURE — 99239 HOSP IP/OBS DSCHRG MGMT >30: CPT | Performed by: FAMILY MEDICINE

## 2018-05-11 RX ORDER — ALPRAZOLAM 0.25 MG/1
0.25 TABLET ORAL NIGHTLY PRN
Qty: 30 TABLET | Refills: 1 | Status: SHIPPED | OUTPATIENT
Start: 2018-05-11

## 2018-05-11 RX ORDER — NYSTATIN 100000 [USP'U]/G
POWDER TOPICAL 2 TIMES DAILY
Qty: 120 G | Refills: 5 | Status: SHIPPED | OUTPATIENT
Start: 2018-05-11

## 2018-05-11 RX ORDER — LISINOPRIL 10 MG/1
10 TABLET ORAL
Qty: 30 TABLET | Refills: 5 | Status: SHIPPED | OUTPATIENT
Start: 2018-05-12 | End: 2018-01-01 | Stop reason: SDUPTHER

## 2018-05-11 RX ORDER — LEVOFLOXACIN 500 MG/1
500 TABLET, FILM COATED ORAL EVERY 24 HOURS
Qty: 2 TABLET | Refills: 0 | Status: SHIPPED | OUTPATIENT
Start: 2018-05-11 | End: 2018-05-13

## 2018-05-11 RX ORDER — MIRTAZAPINE 15 MG/1
15 TABLET, FILM COATED ORAL NIGHTLY
Qty: 30 TABLET | Refills: 5 | Status: SHIPPED | OUTPATIENT
Start: 2018-05-11 | End: 2018-07-30 | Stop reason: SDUPTHER

## 2018-05-11 RX ORDER — AMOXICILLIN AND CLAVULANATE POTASSIUM 875; 125 MG/1; MG/1
1 TABLET, FILM COATED ORAL EVERY 12 HOURS SCHEDULED
Qty: 10 TABLET | Refills: 0 | Status: SHIPPED | OUTPATIENT
Start: 2018-05-11 | End: 2018-07-30

## 2018-05-11 RX ADMIN — IPRATROPIUM BROMIDE AND ALBUTEROL SULFATE 3 ML: .5; 3 SOLUTION RESPIRATORY (INHALATION) at 07:42

## 2018-05-11 RX ADMIN — LISINOPRIL 10 MG: 10 TABLET ORAL at 08:24

## 2018-05-11 RX ADMIN — POTASSIUM CHLORIDE 20 MEQ: 750 CAPSULE, EXTENDED RELEASE ORAL at 08:24

## 2018-05-11 RX ADMIN — LEVOFLOXACIN 500 MG: 500 TABLET, FILM COATED ORAL at 08:25

## 2018-05-11 RX ADMIN — INSULIN ASPART 5 UNITS: 100 INJECTION, SOLUTION INTRAVENOUS; SUBCUTANEOUS at 17:56

## 2018-05-11 RX ADMIN — Medication: at 08:23

## 2018-05-11 RX ADMIN — NYSTATIN: 100000 POWDER TOPICAL at 08:22

## 2018-05-11 RX ADMIN — TAZOBACTAM SODIUM AND PIPERACILLIN SODIUM 3.38 G: 375; 3 INJECTION, SOLUTION INTRAVENOUS at 06:34

## 2018-05-11 RX ADMIN — HYDROCORTISONE: 1 CREAM TOPICAL at 08:22

## 2018-05-11 RX ADMIN — IPRATROPIUM BROMIDE AND ALBUTEROL SULFATE 3 ML: .5; 3 SOLUTION RESPIRATORY (INHALATION) at 11:23

## 2018-05-11 NOTE — PROGRESS NOTES
FAMILY MEDICINE PROGRESS NOTE  NAME: Tessy Gonzalez  : 1940  MRN: [id number]     LOS: 11 days   Conditional Code  PROVIDER OF SERVICE:     Chief Complaint:  Sepsis due to Klebsiella pneumoniae    Subjective     Interval History:  History taken from: patient chart RN  Subjective: Patient is a 76 yo  female who was admitted with septic shock.  She isn't eating much per family.  She states she isn't hungry and doesn't want anything.    Review of Systems:   Review of Systems   Unable to perform ROS: Mental status change       Objective     Vital Signs  Temp:  [97.6 °F (36.4 °C)-98.7 °F (37.1 °C)] 98.7 °F (37.1 °C)  Heart Rate:  [66-84] 73  Resp:  [16-22] 18  BP: (133-149)/(64-68) 149/68    Physical Exam  Physical Exam   Constitutional: She appears well-developed and well-nourished. No distress.   Cardiovascular: Normal rate, regular rhythm, normal heart sounds and intact distal pulses.  Exam reveals no gallop and no friction rub.    No murmur heard.  Pulmonary/Chest: No respiratory distress. She has no wheezes. She has no rales.   Diminished breath sounds in the bases, poor inspiratory effort   Abdominal: Soft. Bowel sounds are normal. She exhibits no distension. There is no tenderness. There is no rebound and no guarding.   Musculoskeletal: She exhibits deformity.   Right lower leg lateral aspect large groove   Neurological: She is alert.   Skin: She is not diaphoretic.   Nursing note and vitals reviewed.      Medication Review    Current Facility-Administered Medications:   •  8-hydroxyquinoline sulfate (BAG BALM) ointment, , Apply externally, BID, Glo Kirk MD  •  acetaminophen (TYLENOL) tablet 650 mg, 650 mg, Oral, Q6H PRN, Jason Beckman MD, 650 mg at 18 1806  •  ALPRAZolam (XANAX) tablet 0.25 mg, 0.25 mg, Oral, Nightly PRN, Glo Kirk MD  •  dextrose (D50W) solution 25 g, 25 g, Intravenous, Q15 Min PRN, Jason Beckman MD, 25 g at 18 0503  •  dextrose (GLUTOSE) oral gel 15 g,  15 g, Oral, Q15 Min PRN, Jason Beckman MD  •  enalaprilat (VASOTEC) injection 1.25 mg, 1.25 mg, Intravenous, Q6H PRN, Glo Kirk MD, 1.25 mg at 05/04/18 1155  •  glucagon (human recombinant) (GLUCAGEN DIAGNOSTIC) injection 1 mg, 1 mg, Subcutaneous, PRN, Jason Beckman MD  •  insulin aspart (novoLOG) injection 0-14 Units, 0-14 Units, Subcutaneous, 4x Daily AC & at Bedtime, Jason Beckman MD, 8 Units at 05/10/18 1707  •  insulin detemir (LEVEMIR) injection 14 Units, 14 Units, Subcutaneous, Nightly, Glo Kirk MD, 14 Units at 05/09/18 2122  •  ipratropium-albuterol (DUO-NEB) nebulizer solution 3 mL, 3 mL, Nebulization, 4x Daily - RT, Glo Kirk MD, 3 mL at 05/10/18 1524  •  levoFLOXacin (LEVAQUIN) tablet 500 mg, 500 mg, Oral, Q24H, Glo Kirk MD, 500 mg at 05/10/18 0909  •  lisinopril (PRINIVIL,ZESTRIL) tablet 10 mg, 10 mg, Oral, Q24H, Glo Kirk MD, 10 mg at 05/10/18 0909  •  magic butt ointment, , Topical, BID, Kulwinder Hernandez MD  •  mirtazapine (REMERON) tablet 15 mg, 15 mg, Oral, Nightly, Glo Kirk MD  •  nystatin (MYCOSTATIN) powder, , Topical, Q12H, Jason Beckman MD  •  piperacillin-tazobactam (ZOSYN) 3.375 g in iso-osmotic dextrose 50 ml (premix), 3.375 g, Intravenous, Q8H, Glo Kirk MD, 3.375 g at 05/10/18 1513  •  potassium chloride (MICRO-K) CR capsule 20 mEq, 20 mEq, Oral, Daily, Glo Kirk MD, 20 mEq at 05/10/18 0909  •  sodium chloride 0.9 % flush 1-10 mL, 1-10 mL, Intravenous, PRN, Jason Beckman MD, 10 mL at 05/07/18 1031     Diagnostic Data      I reviewed the patient's new clinical results and imaging.      Assessment/Plan     Active Hospital Problems (** Indicates Principal Problem)    Diagnosis Date Noted   • **Sepsis due to Klebsiella pneumoniae [A41.4] 04/29/2018   • Asymptomatic gallstones [K80.20] 05/10/2018   • Hypokalemia [E87.6] 05/06/2018   • Onychomycosis of multiple toenails with type 2 diabetes mellitus and peripheral neuropathy [E11.42, B35.1, E11.69]  05/06/2018     Nails trimmed     • Proctitis [K62.89] 05/05/2018   • Angina at rest [I20.8] 05/04/2018   • Dementia with behavioral disturbance [F03.91] 05/04/2018   • Left lower quadrant pain [R10.32] 05/04/2018   • Generalized anxiety disorder [F41.1] 05/04/2018   • Chronic pain syndrome [G89.4] 05/04/2018   • Acute on chronic renal failure [N17.9, N18.9] 04/30/2018   • Bacteremia due to Klebsiella pneumoniae [R78.81] 04/30/2018   • UTI (urinary tract infection) [N39.0] 04/29/2018   • Long-term insulin use in type 2 diabetes [E11.9, Z79.4] 04/29/2018   • Hypertension [I10] 04/29/2018   • Morbid obesity [E66.01]       Resolved Hospital Problems    Diagnosis Date Noted Date Resolved   • Septic shock [A41.9, R65.21] 04/29/2018 05/01/2018     White count trending down.  US shows gallstones but she is asymptomatic.  Add Glucerna for appetite.  Advised family they need to discuss possible feeding tube placement if she doesn't eat.  Stop the Cymbalta and switch to Remeron to see if that helps the appetite.  Hopefully discharge tomorrow.    DVT prophylaxis: SCDs/TEDs      Disposition:Home with home health          This document has been electronically signed by Glo Kirk MD on May 10, 2018 8:06 PM

## 2018-05-11 NOTE — CONSULTS
Nutrition Services    Patient Name:  Tessy Gonzalez  YOB: 1940  MRN: [id number]  Admit Date:  4/29/2018  Pt is being discharged home today.  She is eating better this morning per family. She will not drink the Glucerna bu will drink chocolate milk.  Md noted that she has spoken with family about the possibility of a feeding tube if pt did not maintain nutritional needs.  She is now on Remeron.  Spoke with family about a high protein/nutrient dense diet.  They voiced understanding    Electronically signed by:  Sera Fong RD  05/11/18 11:27 AM

## 2018-05-11 NOTE — DISCHARGE SUMMARY
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 [E11.42, B35.1, E11.69] 05/06/2018     Nails trimmed     • Proctitis [K62.89] 05/05/2018   • Left lower quadrant pain [R10.32] 05/04/2018   • Generalized anxiety disorder [F41.1] 05/04/2018   • Chronic pain syndrome [G89.4] 05/04/2018   • Acute on chronic renal failure [N17.9, N18.9] 04/30/2018   • UTI (urinary tract infection) [N39.0] 04/29/2018   • Long-term insulin use in type 2 diabetes [E11.9, Z79.4] 04/29/2018   • Hypertension [I10] 04/29/2018   • Morbid obesity [E66.01]       Resolved Hospital Problems    Diagnosis Date Noted Date Resolved   • Hypokalemia [E87.6] 05/06/2018 05/11/2018   • Angina at rest [I20.8] 05/04/2018 05/11/2018   • Bacteremia due to Klebsiella pneumoniae [R78.81] 04/30/2018 05/11/2018   • Sepsis due to Klebsiella pneumoniae [A41.4] 04/29/2018 05/11/2018   • Septic shock [A41.9, R65.21] 04/29/2018 05/01/2018       Consults:   Consults     No orders found from 3/31/2018 to 4/30/2018.          Procedures Performed:                 Pertinent Test Results:   Blood Culture - Blood,   Order: [id number]   Status:  Final result   Visible to patient:  No (Not Released)   Newer results are available. Click to view them now.   Culture           Klebsiella pneumoniae        ISOLATED FROM Anaerobic Bottle       Stain     Anaerobic Bottle Gram negative bacilli   1 of 4 bottles positive   Aerobic Bottle Gram negative bacilli   4 of 4 bottles positive         Resulting Agency: [facility]  "  Susceptibility      Klebsiella pneumoniae     KELLIE     Amoxicillin + Clavulanate <=8/4 ug/ml\"><=8/4 ug/ml Susceptible     Ampicillin >16 ug/ml Resistant     Ampicillin + Sulbactam <=8/4 ug/ml\"><=8/4 ug/ml Susceptible     Cefazolin <=8 ug/ml\"><=8 ug/ml Susceptible     Cefepime <=8 ug/ml\"><=8 ug/ml Susceptible     Cefoxitin <=8 ug/ml\"><=8 ug/ml Susceptible     Ceftazidime <=1 ug/ml\"><=1 ug/ml Susceptible     Ceftriaxone <=8 ug/ml\"><=8 ug/ml Susceptible     Cefuroxime sodium <=4 ug/ml\"><=4 ug/ml Susceptible     Levofloxacin <=2 ug/ml\"><=2 ug/ml Susceptible     Piperacillin + Tazobactam <=16 ug/ml\"><=16 ug/ml Susceptible     Tetracycline <=4 ug/ml\"><=4 ug/ml Susceptible     Trimethoprim + Sulfamethoxazole <=2/38 ug/ml\"><=2/38 ug/ml Susceptible               Urine Culture - Urine, Urine, Clean Catch   Order: [id number]   Status:  Final result   Visible to patient:  No (Not Released)   Specimen Information: Urine, Clean Catch; Urine        Culture           50,000-60,000 CFU/mL Klebsiella pneumoniae        Resulting Agency: [facility]   Susceptibility      Klebsiella pneumoniae     KELLIE     Amoxicillin + Clavulanate <=8/4 ug/ml\"><=8/4 ug/ml Susceptible     Ampicillin >16 ug/ml Resistant     Ampicillin + Sulbactam <=8/4 ug/ml\"><=8/4 ug/ml Susceptible     Cefazolin <=8 ug/ml\"><=8 ug/ml Susceptible     Cefepime <=8 ug/ml\"><=8 ug/ml Susceptible     Cefoxitin <=8 ug/ml\"><=8 ug/ml Susceptible     Ceftazidime <=1 ug/ml\"><=1 ug/ml Susceptible     Ceftriaxone <=8 ug/ml\"><=8 ug/ml Susceptible     Cefuroxime sodium <=4 ug/ml\"><=4 ug/ml Susceptible     Levofloxacin <=2 ug/ml\"><=2 ug/ml Susceptible     Nitrofurantoin <=32 ug/ml\"><=32 ug/ml Susceptible     Piperacillin + Tazobactam <=16 ug/ml\"><=16 ug/ml Susceptible     Tetracycline <=4 ug/ml\"><=4 ug/ml Susceptible     Trimethoprim + Sulfamethoxazole <=2/38 ug/ml\"><=2/38 ug/ml Susceptible                          Chief Complaint on Day of Discharge: Dementia with behavioral " disturbance    Hospital Course:  The patient is a 78 y.o. female who presented to [facility] with septic shock.  She ws originally admitted by the hospitalists to the ICU from [facility].  She had a CVP placed in the ER in the left IJ.  She was given IVF and placed on Levophed.  She was started on broadspectrum antibiotics.  She is home bound and has a chronic cantu catheter.  The family asked if I would take over care since I took care of her  and her previously.  She was found to have Klebsiella pneumoniae in her blood and urine.  Levaquin was added to the Zosyn.  She was able to be weaned off the Levophed and transitioned to the floor.  Her white count slowly trended down but started climbing again after stopping the Zosyn.  She had a CT scan to make sure she didn't have an abscess and she was found to have pneumonia as well.  Her renal function slowly improved back down to baseline.  She had an echocardiogram that was pretty stable from the previous one.  Speech was consulted for a swallow evaluation.  Patient wasn't eating good but has underlying dementia as well.  Family was advised that end stage dementia people quit eating.  She was started on Remeron for an appetite stimulant.  She would scream out when people touched her.  Physical therapy and occupational therapy were consulted but she refused to work with them.  She had repeat cultures when the white count started increasing.  The family asked about a swing bed here but was advised we no longer had that option.  The family did not want to go to [facility] swing bed.  They decided to take her back home for now with home health.  She is unable to get out of the house for any appointments.  She is bed bound.  Family asked me to take over her home health orders.  She had her toe nails trimmed in the hospital while she was here.  She was felt stable to be discharged home.        Condition on Discharge:  Stable  Review of Systems  "  Unable to perform ROS: Dementia       Physical Exam on Discharge:  /68 (BP Location: Left arm, Patient Position: Lying)   Pulse 83   Temp 98.6 °F (37 °C) (Oral)   Resp 18   Ht 167.6 cm (66\")   Wt 112 kg (246 lb 7.6 oz)   SpO2 95%   BMI 39.78 kg/m²   Physical Exam   Constitutional: She appears well-developed and well-nourished. No distress.   Cardiovascular: Normal rate, regular rhythm, normal heart sounds and intact distal pulses.  Exam reveals no gallop and no friction rub.    No murmur heard.  Pulmonary/Chest: Breath sounds normal. No respiratory distress. She has no wheezes. She has no rales.   Diminished breath sounds in the bases, poor inspiratory effort.   Abdominal: Soft. Bowel sounds are normal. She exhibits no distension. There is no tenderness. There is no rebound and no guarding.   Neurological: She is alert.   Not oriented   Skin: Skin is dry. She is not diaphoretic.   Nursing note and vitals reviewed.        Discharge Disposition: Home with home health      Discharge Medications:   Tessy Gonzalez   Home Medication Instructions ELIZABETH:[id number]    Printed on:05/15/18 1610   Medication Information                      acetaminophen (TYLENOL) 500 MG tablet  Take 1,000 mg by mouth every 6 (six) hours as needed for mild pain (1-3).             ALPRAZolam (XANAX) 0.25 MG tablet  Take 1 tablet by mouth At Night As Needed for Anxiety or Sleep.             amoxicillin-clavulanate (AUGMENTIN) 875-125 MG per tablet  Take 1 tablet by mouth Every 12 (Twelve) Hours.             Bacitracin Zinc (MAGIC BUTT OINTMENT)  Apply 1 each topically 2 (Two) Times a Day.             Insulin Glargine (LANTUS SOLOSTAR) 100 UNIT/ML injection pen  14 units sq bid             Insulin Pen Needle 31G X 4 MM misc  Use as directed 6 TIMES DAILY with insulin injection. DIAG. CODE 250.00             lisinopril (PRINIVIL,ZESTRIL) 10 MG tablet  Take 1 tablet by mouth Daily.             mirtazapine (REMERON) 15 MG " tablet  Take 1 tablet by mouth Every Night.             Misc. Devices (COMMODE BEDSIDE) misc  1 each Continuous.             nystatin (MYCOSTATIN) 569841 UNIT/GM powder  Apply  topically 2 (Two) Times a Day.             OMEPRAZOLE PO  Take 20 mg by mouth 2 (Two) Times a Day.                 Discharge Diet:   Diet Instructions     Diet: Consistent Carbohydrate, Cardiac       Discharge Diet:   Consistent Carbohydrate  Cardiac             Activity at Discharge:   Activity Instructions     Other Instructions (Specify)       bedrest            Special Instructions: Patient instructed to call MD or return to ED with worsening shortness of breath, chest pain, fever greater than 100.4 degrees F or any other medical concerns patient may have.   Follow-up Appointments:    Contact information for follow-up providers     Greg Frank MD Follow up.    Specialty:  Family Medicine  Why:  , 2018 AT 10:00 A.M.  KINSEY MONROY  Contact information:  [address] [place] [id number] [phone]                   Contact information for after-discharge care     Home Medical Care     [facility] .    Specialty:  Home Health Services  Contact information:  [address] [place] [id number] [phone]                           No future appointments.              This document has been electronically signed by Glo Kirk MD on May 15, 2018 4:10 PM    Time:  36 min

## 2018-05-11 NOTE — PLAN OF CARE
Problem: Patient Care Overview  Goal: Plan of Care Review  Outcome: Ongoing (interventions implemented as appropriate)    Goal: Individualization and Mutuality  Outcome: Ongoing (interventions implemented as appropriate)    Goal: Discharge Needs Assessment  Outcome: Ongoing (interventions implemented as appropriate)    Goal: Interprofessional Rounds/Family Conf  Outcome: Ongoing (interventions implemented as appropriate)      Problem: Fall Risk (Adult)  Goal: Absence of Fall  Outcome: Ongoing (interventions implemented as appropriate)      Problem: Skin Injury Risk (Adult)  Goal: Skin Health and Integrity  Outcome: Ongoing (interventions implemented as appropriate)      Problem: Urinary Tract Infection (Adult)  Goal: Signs and Symptoms of Listed Potential Problems Will be Absent, Minimized or Managed (Urinary Tract Infection)  Outcome: Ongoing (interventions implemented as appropriate)      Problem: Pain, Chronic (Adult)  Goal: Acceptable Pain/Comfort Level and Functional Ability  Outcome: Ongoing (interventions implemented as appropriate)

## 2018-05-11 NOTE — DISCHARGE INSTRUCTIONS
Bacteremia  Bacteremia is the presence of bacteria in the blood. When bacteria enter the bloodstream, they can cause a life-threatening reaction called sepsis, which is a medical emergency. Bacteremia can spread to other parts of the body, including the heart, joints, and brain.  What are the causes?  This condition is caused by bacteria that get into the blood. Bacteria can enter the blood:  · From a skin infection or a cut on your skin.  · During an episode of pneumonia.  · From an infection in your stomach or intestine (gastrointestinal infection).  · From an infection in your bladder or urinary system (urinary tract infection).  · During a dental or medical procedure.  · After you brush your teeth so hard that your gums bleed.  · When a bacterial infection in another part of the body spreads to the blood.  · Through a dirty needle.  What increases the risk?  This condition is more likely to develop in:  · Children.  · Elderly adults.  · People who have a long-lasting (chronic) disease or medical condition.  · People who have an artificial joint or heart valve.  · People who have heart valve disease.  · People who have a tube, such as a catheter or IV tube, that has been inserted for a medical treatment.  · People who have a weak body defense system (immune system).  · People who use IV drugs.  What are the signs or symptoms?  Symptoms of this condition include:  · Fever.  · Chills.  · A racing heart.  · Shortness of breath.  · Dizziness.  · Weakness.  · Confusion.  · Nausea or vomiting.  · Diarrhea.  In some cases, there are no symptoms. Bacteremia that has spread to the other parts of the body may cause symptoms in those areas.  How is this diagnosed?  This condition may be diagnosed with a physical exam and tests, such as:  · A complete blood count (CBC). This test looks for signs of infection.  · Blood cultures. These look for bacteria in your blood.  · Tests of any tubes that you may have inserted into your  body, such as an IV tube or urinary catheter. These tests look for a source of infection.  · Urine tests including urine cultures. These look for bacteria in the urine that could be a source of infection.  · Imaging tests, such as an X-ray, CT scan, MRI, or heart ultrasound. These look for a source of infection in other parts of the body, such as the lungs, heart valves, or joints.  How is this treated?  This condition may be treated with:  · Antibiotic medicines given through an IV infusion. Depending on the source of infection, antibiotics may be needed for several weeks. At first, an antibiotic may be given to kill most types of blood bacteria. If your test results show that a certain kind of bacteria is causing problems, the antibiotic may be changed to kill only the bacteria that are causing problems.  · Antibiotics taken by mouth.  · IV fluids to support the body as you fight the infection.  · Removing any catheter or device that could be a source of infection.  · Blood pressure and breathing support, if you have sepsis.  · Surgery to control the source or spread of infection, such as:  ¨ Removing an infected implanted device.  ¨ Removing infected tissue or an abscess.  This condition is usually treated at a hospital. If you are treated at home, you may need to come back for medicines, blood tests, and evaluation. This is important.  Follow these instructions at home:  · Take over-the-counter and prescription medicines only as told by your health care provider.  · If you were prescribed an antibiotic, take it as told by your health care provider. Do not stop taking the antibiotic even if you start to feel better.  · Rest until your condition is under control.  · Drink enough fluid to keep your urine clear or pale yellow.  · Do not smoke. If you need help quitting, ask your health care provider.  · Keep all follow-up visits as told by your health care provider. This is important.  How is this prevented?  · Get  the vaccinations that your health care provider recommends.  · Clean and cover any scrapes or cuts.  · Take good care of your skin. This includes regular bathing and moisturizing.  · Wash your hands often.  · Practice good oral hygiene. Brush your teeth two times a day and floss regularly.  Get help right away if:  · You have pain.  · You have a fever.  · You have trouble breathing.  · Your skin becomes blotchy, pale, or clammy.  · You develop confusion, dizziness, or weakness.  · You develop diarrhea.  · You develop any new symptoms after treatment.  Summary  · Bacteremia is the presence of bacteria in the blood. When bacteria enter the bloodstream, they can cause a life- threatening reaction called sepsis.  · Children and elderly adults are at increased risk of bacteremia. Other risk factors include having a long-lasting (chronic) disease or a weak immune system, having an artificial joint or heart valve, having heart valve disease, having tubes that were inserted in the body for medical treatment, or using IV drugs.  · Some symptoms of bacteremia include fever, chills, shortness of breath, confusion, nausea or vomiting, and diarrhea.  · Tests may be done to diagnose a source of infection that led to bacteremia. These tests may include blood tests, urine tests, and imaging tests.  · Bacteremia is usually treated with antibiotics, usually in a hospital.  This information is not intended to replace advice given to you by your health care provider. Make sure you discuss any questions you have with your health care provider.  Document Released: 10/01/2007 Document Revised: 11/14/2017 Document Reviewed: 11/14/2017  [employer] Patient Education © 2017 [employer]

## 2018-05-11 NOTE — DISCHARGE PLACEMENT REQUEST
"Justin Gonzalez (78 y.o. Female)     Date of Birth Social Security Number Address Home Phone MRN    1940  [address] [place] [address] [phone] [id number]    [employer] Marital Status          Roman Catholic        Admission Date Admission Type Admitting Provider Attending Provider Department, Room/Bed    4/29/18 Urgent Jason Beckman MD Henson, Tara H, MD [address], 367/1    Discharge Date Discharge Disposition Discharge Destination         Home-Health Care [facility] [place]              Attending Provider:  Glo Kirk MD    Allergies:  Calcium Carbonate, Cephalexin, Cephalosporins, Sulfa Antibiotics    Isolation:  None   Infection:  None   Code Status:  Conditional    Ht:  167.6 cm (66\")   Wt:  112 kg (246 lb 7.6 oz)    Admission Cmt:  None   Principal Problem:  Dementia with behavioral disturbance [F03.91]                 Active Insurance as of 4/29/2018     Primary Coverage     Payor Plan Insurance Group Employer/Plan Group    MEDICARE MEDICARE A & B      Payor Plan Address Payor Plan Phone Number Effective From Effective To    [address] [phone] 11/1/2001     [place] [address]       Subscriber Name Subscriber Birth Date Member ID       JUSTIN GONZALEZ 1940 [id number]           Secondary Coverage     Payor Plan Insurance Group Employer/Plan Group    [facility]      Payor Plan Address Payor Plan Phone Number Effective From Effective To    [address] [address]  1/1/2014     [place] [address]       Subscriber Name Subscriber Birth Date Member ID       JUSTIN GONZALEZ 1940 [id number]                 Emergency Contacts      (Rel.) Home Phone Work Phone Mobile Phone    Elle De Anda (Grandchild) [phone] -- --    Malinda Faustin (Daughter) [phone] -- --    Carmita De Anda (Daughter) -- -- [phone]          [address] [address]  Phone:  " [phone]  Fax:   Date: May 11, 2018      Referral to Home Health     Patient:  Tessy Gonzalez MRN:  [id number]   [address] [place] [address] :  1940  SSN:    Phone: [phone] Sex:  F      INSURANCE PAYOR PLAN GROUP # SUBSCRIBER ID   Primary:  Secondary: MEDICARE  [facility] [place] [id number]  [id number]   [id number]  [id number]      Referring Provider Information:  GLO RAE Phone: [phone] Fax:       Referral Information:   # Visits:  1 Referral Type: Home Health [42]   Urgency:  Routine Referral Reason: Specialty Services Required   Start Date: May 11, 2018 End Date:  To be determined by Insurer   Diagnosis: Chronic retention of urine (R33.9 [ICD-10-CM] 788.29 [ICD-9-CM])  Sepsis due to Klebsiella pneumoniae (A41.4 [ICD-10-CM] 038.49,995.91 [ICD-9-CM])      Refer to Dept:   Refer to Provider:   Refer to Facility:       Face to Face Visit Date: 2018  Follow-up Provider for Plan of Care? I will be treating the patient on an ongoing basis.  Please send me the Plan of Care for signature.  Follow-up Provider: GLO RAE [[id number]]  Reason/Clinical Findings: sepsis, chronic urine retention  Describe mobility limitations that make leaving home difficult: patient is bedbound, imobile  Nursing/Therapeutic Services Requested: Skilled Nursing  Skilled nursing orders: Monthly catheter care  Skilled nursing orders: Medication education  Frequency: 1 Week 1     This document serves as a request of services and does not constitute Insurance authorization or approval of services.  To determine eligibility, please contact the members Insurance carrier to verify and review coverage.     If you have medical questions regarding this request for services. Please contact [address] at [phone] between the hours of 8:00am - 5:00pm (Mon-Fri).             Authorizing Provider:Glo Rae MD  Authorizing Provider's NPI: [id number]  Order Entered By: Glo Rae MD  5/11/2018 10:46 AM     Electronically signed by: Glo Kirk MD 5/11/2018 10:46 AM                History & Physical      Jason Beckman MD at 4/29/2018 11:30 PM          History and Physical  Jason Beckman MD  Hospitalist    Date of admission: 4/29/2018    Patient Care Team:  Greg Frank MD as PCP - General (Family Medicine)    Chief complaint Generalized weakness.    Subjective     Patient is a 77 y.o. female admitted for generalized weakness, lethargy. When seen at the ER in [place] her BP was as low as 80/40, she was diagnosed with a severe UTI / sepsis / septic shock, was provided with a 3 L IV fluid bolus, IV antibiotics, was started on IV Levophed and transferred here for further workup and management.    She suffers from recurrent UTIs given the indwelling Piper she wears as she is bedfast. She is a poor historian.    History  Past Medical History:   Diagnosis Date   • Atherosclerosis    • Basal cell carcinoma    • Bilateral leg edema    • CKD (chronic kidney disease) stage 3, GFR 30-59 ml/min    • Degenerative joint disease involving multiple joints    • Dementia    • Depression    • Diabetes mellitus    • Hypertension    • Ischemic stroke    • Morbid obesity    • Peripheral venous insufficiency    • Recurrent UTI (urinary tract infection)      Past Surgical History:   Procedure Laterality Date   • BIOPSY SKIN / SQ / MUCOUS MEMBRANE     • HIP ARTHROPLASTY Right      Family History   Problem Relation Age of Onset   • Hypertension Father      Social History   Substance Use Topics   • Smoking status: Never Smoker   • Smokeless tobacco: Never Used   • Alcohol use No     Prescriptions Prior to Admission   Medication Sig Dispense Refill Last Dose   • acetaminophen (TYLENOL) 500 MG tablet Take 1,000 mg by mouth every 6 (six) hours as needed for mild pain (1-3).   4/29/2018 at Unknown time   • citalopram (CeleXA) 20 MG tablet Take 20 mg by mouth daily.   4/29/2018 at Unknown time   • citalopram  (CeleXA) 20 MG tablet Take 20 mg by mouth Daily.      • hydrochlorothiazide (HYDRODIURIL) 25 MG tablet Take 25 mg by mouth Daily.   4/29/2018 at Unknown time   • Insulin Glargine (LANTUS SOLOSTAR) 100 UNIT/ML injection pen Inject 30 Units under the skin 2 (Two) Times a Day.   4/28/2018 at Unknown time   • OMEPRAZOLE PO Take 20 mg by mouth 2 (Two) Times a Day.   4/28/2018 at Unknown time   • QUEtiapine (SEROquel) 25 MG tablet Take 25 mg by mouth 2 (Two) Times a Day.   4/29/2018 at Unknown time   • tamsulosin (FLOMAX) 0.4 MG capsule 24 hr capsule Take 0.4 mg by mouth Daily.   4/29/2018 at Unknown time   • hydrochlorothiazide (HYDRODIURIL) 25 MG tablet Take 1 daily as recommended.  **Need to be seen, Last refill until you see Dr. SANJAY Snog** 90 tablet 0    • Insulin Pen Needle 31G X 4 MM misc Use as directed 6 TIMES DAILY with insulin injection. DIAG. CODE 250.00   Unknown at Unknown time   • Misc. Devices (COMMODE BEDSIDE) misc 1 each Continuous. 1 each 0 Unknown at Unknown time     Allergies:  Calcium carbonate; Cephalexin; Cephalosporins; and Sulfa antibiotics    Review of Systems  Review of Systems   Constitutional: Positive for fatigue and fever.   Respiratory: Negative for cough, shortness of breath and wheezing.    Cardiovascular: Negative for chest pain, palpitations and leg swelling.   Gastrointestinal: Positive for abdominal distention and nausea. Negative for abdominal pain, constipation, diarrhea and vomiting.   Genitourinary: Positive for decreased urine volume, dysuria, frequency, hematuria and urgency.   Musculoskeletal: Positive for back pain and gait problem. Negative for arthralgias and myalgias.   Skin: Positive for pallor.   Neurological: Positive for weakness, light-headedness and headaches. Negative for tremors, seizures, syncope and numbness.   Psychiatric/Behavioral: Negative for agitation, behavioral problems and confusion.   All other systems reviewed and are negative.      Objective     Vital  Signs  Temp:  [97 °F (36.1 °C)-99.8 °F (37.7 °C)] 97 °F (36.1 °C)  Heart Rate:  [] 98  Resp:  [20] 20  BP: ()/(45-54) 112/51    Physical Exam:  Physical Exam   Constitutional: She is oriented to person, place, and time. She appears ill. No distress.   Obese    HENT:   Head: Normocephalic and atraumatic.   Triple lumen IJ on the L   Eyes: EOM are normal. Pupils are equal, round, and reactive to light. No scleral icterus.   Neck: Normal range of motion. Neck supple.   Cardiovascular: Normal rate and regular rhythm.    Pulmonary/Chest: Effort normal and breath sounds normal. No respiratory distress.   Abdominal: Soft. Bowel sounds are normal. She exhibits distension. She exhibits no mass. There is tenderness (minimal). There is no guarding.   Musculoskeletal: She exhibits no edema or tenderness.   Neurological: She is alert and oriented to person, place, and time. No cranial nerve deficit.   Skin: Skin is dry. No rash noted. No erythema. There is pallor.   Dry skin / extensive mucocutaneous candidiasis in her groin and abdominal folds   Psychiatric: She is slowed.   Vitals reviewed.      Results Review:   Lab Results (last 24 hours)     Procedure Component Value Units Date/Time    Comprehensive Metabolic Panel [[id number]]  (Abnormal) Collected:  04/30/18 0426    Specimen:  Blood Updated:  04/30/18 0459     Glucose 248 (H) mg/dL      BUN 46 (H) mg/dL      Creatinine 2.44 (H) mg/dL      Sodium 138 mmol/L      Potassium 3.5 mmol/L      Chloride 103 mmol/L      CO2 18.0 (L) mmol/L      Calcium 8.7 mg/dL      Total Protein 6.0 (L) g/dL      Albumin 2.70 (L) g/dL      ALT (SGPT) 12 U/L      AST (SGOT) 28 U/L      Alkaline Phosphatase 74 U/L      Total Bilirubin 0.4 mg/dL      eGFR Non African Amer 19 (L) mL/min/1.73      Globulin 3.3 gm/dL      A/G Ratio 0.8 (L) g/dL      BUN/Creatinine Ratio 18.9     Anion Gap 17.0 (H) mmol/L     Narrative:       The MDRD GFR formula is only valid for adults with stable renal  function between ages 18 and 70.    Magnesium [[id number]]  (Abnormal) Collected:  04/30/18 0426    Specimen:  Blood Updated:  04/30/18 0459     Magnesium 1.1 (L) mg/dL     Protime-INR [[id number]]  (Abnormal) Collected:  04/30/18 0426    Specimen:  Blood Updated:  04/30/18 0457     Protime 17.1 (H) Seconds      INR 1.44 (H)    Narrative:       Therapeutic range for most indications is 2.0-3.0 INR,  or 2.5-3.5 for mechanical heart valves.    Lactic Acid, Reflex [[id number]]  (Abnormal) Collected:  04/30/18 0426    Specimen:  Blood Updated:  04/30/18 0456     Lactate 5.0 (C) mmol/L     CBC Auto Differential [[id number]]  (Abnormal) Collected:  04/30/18 0426    Specimen:  Blood Updated:  04/30/18 0449     WBC 45.68 (H) 10*3/mm3      RBC 4.09 10*6/mm3      Hemoglobin 12.5 g/dL      Hematocrit 36.8 %      MCV 90.0 fL      MCH 30.6 pg      MCHC 34.0 g/dL      RDW 12.6 %      RDW-SD 41.4 fl      MPV 11.6 fL      Platelets 198 10*3/mm3     Manual Differential [[id number]] Collected:  04/30/18 0426    Specimen:  Blood Updated:  04/30/18 0449    Lactic Acid, Reflex Timer (This will reflex a repeat order 3-3:15 hours after ordered.) [[id number]] Collected:  04/30/18 0038    Specimen:  Blood Updated:  04/30/18 0416     Extra Tube Hold for add-ons.     Comment: Auto resulted.       Urine Culture - Urine, Urine, Clean Catch [[id number]] Collected:  04/30/18 0032    Specimen:  Urine from Urine, Clean Catch Updated:  04/30/18 0344    Lactic Acid, Plasma [[id number]]  (Abnormal) Collected:  04/30/18 0038    Specimen:  Blood Updated:  04/30/18 0114     Lactate 6.2 (C) mmol/L     Protime-INR [[id number]]  (Abnormal) Collected:  04/30/18 0038    Specimen:  Blood Updated:  04/30/18 0102     Protime 16.8 (H) Seconds      INR 1.41 (H)    Narrative:       Therapeutic range for most indications is 2.0-3.0 INR,  or 2.5-3.5 for mechanical heart valves.    POC Glucose Once [[id number]]  (Abnormal) Collected:  04/30/18 0035    Specimen:  Blood Updated:   04/30/18 0049     Glucose 271 (H) mg/dL      Comment: Sliding Scale AdminMeter: [id number]: [id number] CHETNA ESPARZA       Blood Culture - Blood, [[id number]] Collected:  04/30/18 0038    Specimen:  Blood from Hand, Left Updated:  04/30/18 0044    Blood Culture - Blood, [[id number]] Collected:  04/30/18 0038    Specimen:  Blood from Arm, Right Updated:  04/30/18 0044          Imaging Results (last 24 hours)     ** No results found for the last 24 hours. **          Assessment/Plan     Principal Problem:    Septic shock  Active Problems:    Sepsis    UTI (urinary tract infection)    Acute on chronic renal failure    Morbid obesity    Diabetes mellitus    Attempt at maintaining BP, IV fluid, IV pressors, blood cultures, urine cultures, IV Zosyn adjusted to the degree of renal impairment (creatinine of 2.8), glucose control (370 mg/dL), Oxygen, PRN Albuterol nebulized treatments.    Jason Beckman MD  04/30/18  5:59 AM      Electronically signed by Jason Beckman MD at 4/30/2018  6:00 AM       Prior to Admission Medications     Prescriptions Last Dose Informant Patient Reported? Taking?    acetaminophen (TYLENOL) 500 MG tablet 4/29/2018  Yes Yes    Take 1,000 mg by mouth every 6 (six) hours as needed for mild pain (1-3).    citalopram (CeleXA) 20 MG tablet   Yes Yes    Take 20 mg by mouth Daily.    citalopram (CeleXA) 20 MG tablet 4/29/2018  Yes Yes    Take 20 mg by mouth daily.    hydrochlorothiazide (HYDRODIURIL) 25 MG tablet 4/29/2018  Yes Yes    Take 25 mg by mouth Daily.    OMEPRAZOLE PO 4/28/2018  Yes Yes    Take 20 mg by mouth 2 (Two) Times a Day.    QUEtiapine (SEROquel) 25 MG tablet 4/29/2018  Yes Yes    Take 25 mg by mouth 2 (Two) Times a Day.    tamsulosin (FLOMAX) 0.4 MG capsule 24 hr capsule 4/29/2018  Yes Yes    Take 0.4 mg by mouth Daily.    hydrochlorothiazide (HYDRODIURIL) 25 MG tablet   No No    Take 1 daily as recommended.  **Need to be seen, Last refill until you see Dr. SANJAY Song**     Insulin Pen Needle 31G X 4 MM misc Unknown  Yes No    Use as directed 6 TIMES DAILY with insulin injection. DIAG. CODE 250.00    Misc. Devices (COMMODE BEDSIDE) misc Unknown  No No    1 each Continuous.             Physician Progress Notes (last 72 hours) (Notes from 2018 11:13 AM through 2018 11:13 AM)      Glo Kirk MD at 5/10/2018 11:40 AM          FAMILY MEDICINE PROGRESS NOTE  NAME: Tessy Gonzalez  : 1940  MRN: [id number]     LOS: 11 days   Conditional Code  PROVIDER OF SERVICE:     Chief Complaint:  Sepsis due to Klebsiella pneumoniae    Subjective     Interval History:  History taken from: patient chart RN  Subjective: Patient is a 76 yo  female who was admitted with septic shock.  She isn't eating much per family.  She states she isn't hungry and doesn't want anything.    Review of Systems:   Review of Systems   Unable to perform ROS: Mental status change       Objective     Vital Signs  Temp:  [97.6 °F (36.4 °C)-98.7 °F (37.1 °C)] 98.7 °F (37.1 °C)  Heart Rate:  [66-84] 73  Resp:  [16-22] 18  BP: (133-149)/(64-68) 149/68    Physical Exam  Physical Exam   Constitutional: She appears well-developed and well-nourished. No distress.   Cardiovascular: Normal rate, regular rhythm, normal heart sounds and intact distal pulses.  Exam reveals no gallop and no friction rub.    No murmur heard.  Pulmonary/Chest: No respiratory distress. She has no wheezes. She has no rales.   Diminished breath sounds in the bases, poor inspiratory effort   Abdominal: Soft. Bowel sounds are normal. She exhibits no distension. There is no tenderness. There is no rebound and no guarding.   Musculoskeletal: She exhibits deformity.   Right lower leg lateral aspect large groove   Neurological: She is alert.   Skin: She is not diaphoretic.   Nursing note and vitals reviewed.      Medication Review    Current Facility-Administered Medications:   •  8-hydroxyquinoline sulfate (BAG BALM) ointment, , Apply  externally, BID, Glo Kirk MD  •  acetaminophen (TYLENOL) tablet 650 mg, 650 mg, Oral, Q6H PRN, Jason Beckman MD, 650 mg at 05/08/18 1806  •  ALPRAZolam (XANAX) tablet 0.25 mg, 0.25 mg, Oral, Nightly PRN, Glo Kirk MD  •  dextrose (D50W) solution 25 g, 25 g, Intravenous, Q15 Min PRN, Jason Beckman MD, 25 g at 05/01/18 0503  •  dextrose (GLUTOSE) oral gel 15 g, 15 g, Oral, Q15 Min PRN, Jason Beckman MD  •  enalaprilat (VASOTEC) injection 1.25 mg, 1.25 mg, Intravenous, Q6H PRN, Glo Kirk MD, 1.25 mg at 05/04/18 1155  •  glucagon (human recombinant) (GLUCAGEN DIAGNOSTIC) injection 1 mg, 1 mg, Subcutaneous, PRN, Jason Beckman MD  •  insulin aspart (novoLOG) injection 0-14 Units, 0-14 Units, Subcutaneous, 4x Daily AC & at Bedtime, Jason Beckman MD, 8 Units at 05/10/18 1707  •  insulin detemir (LEVEMIR) injection 14 Units, 14 Units, Subcutaneous, Nightly, Glo Kikr MD, 14 Units at 05/09/18 2122  •  ipratropium-albuterol (DUO-NEB) nebulizer solution 3 mL, 3 mL, Nebulization, 4x Daily - RT, Glo Kirk MD, 3 mL at 05/10/18 1524  •  levoFLOXacin (LEVAQUIN) tablet 500 mg, 500 mg, Oral, Q24H, Glo Kirk MD, 500 mg at 05/10/18 0909  •  lisinopril (PRINIVIL,ZESTRIL) tablet 10 mg, 10 mg, Oral, Q24H, Glo Kirk MD, 10 mg at 05/10/18 0909  •  magic butt ointment, , Topical, BID, Kulwinder Hernandez MD  •  mirtazapine (REMERON) tablet 15 mg, 15 mg, Oral, Nightly, Glo Kirk MD  •  nystatin (MYCOSTATIN) powder, , Topical, Q12H, Jason Beckman MD  •  piperacillin-tazobactam (ZOSYN) 3.375 g in iso-osmotic dextrose 50 ml (premix), 3.375 g, Intravenous, Q8H, Glo Kirk MD, 3.375 g at 05/10/18 1513  •  potassium chloride (MICRO-K) CR capsule 20 mEq, 20 mEq, Oral, Daily, Glo Kirk MD, 20 mEq at 05/10/18 0909  •  sodium chloride 0.9 % flush 1-10 mL, 1-10 mL, Intravenous, PRN, Jason Beckman MD, 10 mL at 05/07/18 1031     Diagnostic Data      I reviewed the patient's new clinical results and  imaging.      Assessment/Plan     Active Hospital Problems (** Indicates Principal Problem)    Diagnosis Date Noted   • **Sepsis due to Klebsiella pneumoniae [A41.4] 2018   • Asymptomatic gallstones [K80.20] 05/10/2018   • Hypokalemia [E87.6] 2018   • Onychomycosis of multiple toenails with type 2 diabetes mellitus and peripheral neuropathy [E11.42, B35.1, E11.69] 2018     Nails trimmed     • Proctitis [K62.89] 2018   • Angina at rest [I20.8] 2018   • Dementia with behavioral disturbance [F03.91] 2018   • Left lower quadrant pain [R10.32] 2018   • Generalized anxiety disorder [F41.1] 2018   • Chronic pain syndrome [G89.4] 2018   • Acute on chronic renal failure [N17.9, N18.9] 2018   • Bacteremia due to Klebsiella pneumoniae [R78.81] 2018   • UTI (urinary tract infection) [N39.0] 2018   • Long-term insulin use in type 2 diabetes [E11.9, Z79.4] 2018   • Hypertension [I10] 2018   • Morbid obesity [E66.01]       Resolved Hospital Problems    Diagnosis Date Noted Date Resolved   • Septic shock [A41.9, R65.21] 2018     White count trending down.  US shows gallstones but she is asymptomatic.  Add Glucerna for appetite.  Advised family they need to discuss possible feeding tube placement if she doesn't eat.  Stop the Cymbalta and switch to Remeron to see if that helps the appetite.  Hopefully discharge tomorrow.    DVT prophylaxis: SCDs/TEDs      Disposition:Home with home health          This document has been electronically signed by Glo Kirk MD on May 10, 2018 8:06 PM            Electronically signed by Glo Kirk MD at 5/10/2018  8:09 PM     Glo Kirk MD at 2018 10:38 AM          FAMILY MEDICINE PROGRESS NOTE  NAME: Tessy Gonzalez  : 1940  MRN: [id number]     LOS: 10 days   Conditional Code  PROVIDER OF SERVICE:     Chief Complaint:  Sepsis due to Klebsiella pneumoniae    Subjective      Interval History:  History taken from: patient chart RN  Subjective: Patient is a 76 yo  female who was admitted with septic shock.  She isn't eating much per family.    Review of Systems:   Review of Systems   Unable to perform ROS: Mental status change       Objective     Vital Signs  Temp:  [98.6 °F (37 °C)-100 °F (37.8 °C)] 99.6 °F (37.6 °C)  Heart Rate:  [60-75] 75  Resp:  [14-20] 16  BP: (134-150)/(60-72) 134/63    Physical Exam  Physical Exam   Constitutional: She appears well-developed and well-nourished. No distress.   Cardiovascular: Normal rate, regular rhythm, normal heart sounds and intact distal pulses.  Exam reveals no gallop and no friction rub.    No murmur heard.  Pulmonary/Chest: No respiratory distress. She has no wheezes. She has no rales.   Diminished breath sounds in the bases, poor inspiratory effort   Abdominal: Soft. Bowel sounds are normal. She exhibits no distension. There is no tenderness. There is no rebound and no guarding.   Musculoskeletal: She exhibits deformity.   Right lower leg lateral aspect large groove   Neurological: She is alert.   Skin: She is not diaphoretic.   Nursing note and vitals reviewed.      Medication Review    Current Facility-Administered Medications:   •  8-hydroxyquinoline sulfate (BAG BALM) ointment, , Apply externally, BID, Glo Kirk MD  •  acetaminophen (TYLENOL) tablet 650 mg, 650 mg, Oral, Q6H PRN, Jason Beckman MD, 650 mg at 05/08/18 1806  •  ALPRAZolam (XANAX) tablet 0.25 mg, 0.25 mg, Oral, Nightly PRN, Glo Kirk MD  •  dextrose (D50W) solution 25 g, 25 g, Intravenous, Q15 Min PRN, Jason Beckman MD, 25 g at 05/01/18 0503  •  dextrose (GLUTOSE) oral gel 15 g, 15 g, Oral, Q15 Min PRN, Jason Beckman MD  •  DULoxetine (CYMBALTA) DR capsule 20 mg, 20 mg, Oral, Nightly, Glo Kirk MD, 20 mg at 05/08/18 0373  •  enalaprilat (VASOTEC) injection 1.25 mg, 1.25 mg, Intravenous, Q6H PRN, Glo Kirk MD, 1.25 mg at 05/04/18 1155  •   glucagon (human recombinant) (GLUCAGEN DIAGNOSTIC) injection 1 mg, 1 mg, Subcutaneous, PRN, Jason Beckman MD  •  insulin aspart (novoLOG) injection 0-14 Units, 0-14 Units, Subcutaneous, 4x Daily AC & at Bedtime, Jason Beckman MD, 8 Units at 05/09/18 1730  •  insulin detemir (LEVEMIR) injection 14 Units, 14 Units, Subcutaneous, Nightly, Glo Kirk MD, 14 Units at 05/08/18 2152  •  ipratropium-albuterol (DUO-NEB) nebulizer solution 3 mL, 3 mL, Nebulization, 4x Daily - RT, Glo Kirk MD, 3 mL at 05/09/18 1415  •  levoFLOXacin (LEVAQUIN) tablet 500 mg, 500 mg, Oral, Q24H, Glo Kirk MD, 500 mg at 05/09/18 0931  •  lisinopril (PRINIVIL,ZESTRIL) tablet 10 mg, 10 mg, Oral, Q24H, Glo Kirk MD, 10 mg at 05/09/18 0931  •  magic butt ointment, , Topical, BID, Kulwinder Hernandez MD  •  morphine injection 1 mg, 1 mg, Intravenous, Q4H PRN, 1 mg at 05/09/18 0500 **AND** naloxone (NARCAN) injection 0.4 mg, 0.4 mg, Intravenous, Q5 Min PRN, Jason Beckman MD  •  nystatin (MYCOSTATIN) powder, , Topical, Q12H, Jason Beckman MD  •  piperacillin-tazobactam (ZOSYN) 3.375 g in iso-osmotic dextrose 50 ml (premix), 3.375 g, Intravenous, Q8H, Glo Kirk MD, 3.375 g at 05/09/18 1556  •  potassium chloride (MICRO-K) CR capsule 20 mEq, 20 mEq, Oral, Daily, Glo Kirk MD, 20 mEq at 05/09/18 0931  •  sodium chloride 0.9 % flush 1-10 mL, 1-10 mL, Intravenous, PRN, Jason Beckman MD, 10 mL at 05/07/18 1031     Diagnostic Data      I reviewed the patient's new clinical results and imaging.      Assessment/Plan     Active Hospital Problems (** Indicates Principal Problem)    Diagnosis Date Noted   • **Sepsis due to Klebsiella pneumoniae [A41.4] 04/29/2018   • Hypokalemia [E87.6] 05/06/2018   • Onychomycosis of multiple toenails with type 2 diabetes mellitus and peripheral neuropathy [E11.42, B35.1, E11.69] 05/06/2018     Nails trimmed     • Proctitis [K62.89] 05/05/2018   • Angina at rest [I20.8] 05/04/2018   • Dementia with  behavioral disturbance [F03.91] 05/04/2018   • Left lower quadrant pain [R10.32] 05/04/2018   • Generalized anxiety disorder [F41.1] 05/04/2018   • Chronic pain syndrome [G89.4] 05/04/2018   • Acute on chronic renal failure [N17.9, N18.9] 04/30/2018   • Bacteremia due to Klebsiella pneumoniae [R78.81] 04/30/2018   • UTI (urinary tract infection) [N39.0] 04/29/2018   • Long-term insulin use in type 2 diabetes [E11.9, Z79.4] 04/29/2018   • Hypertension [I10] 04/29/2018   • Morbid obesity [E66.01]       Resolved Hospital Problems    Diagnosis Date Noted Date Resolved   • Septic shock [A41.9, R65.21] 04/29/2018 05/01/2018     Ultrasound of the abdomen pending.  Cultures pending.    DVT prophylaxis: SCDs/TEDs      Disposition:Home with home health          This document has been electronically signed by Glo Kirk MD on May 9, 2018 5:39 PM            Electronically signed by Glo Kirk MD at 5/9/2018  5:41 PM

## 2018-05-12 NOTE — THERAPY DISCHARGE NOTE
Acute Care - Occupational Therapy Discharge Summary  [facility]     Patient Name: Tessy Gonzalez  : 1940  MRN: [id number]    Today's Date: 2018  Onset of Illness/Injury or Date of Surgery: 18    Date of Referral to OT: 18  Referring Physician: Dr. Glo Kirk      Admit Date: 2018        OT Recommendation and Plan    Visit Dx:    ICD-10-CM ICD-9-CM   1. Chronic retention of urine R33.9 788.29   2. Impaired mobility and ADLs Z74.09 799.89   3. Impaired physical mobility Z74.09 781.99   4. Benign hypertensive heart disease without heart failure I11.9 402.10   5. Benign hypertension I10 401.1   6. Chest pain, unspecified type R07.9 786.50   7. Sepsis due to Klebsiella pneumoniae A41.4 038.49     995.91                     OT Rehab Goals     Row Name 18 0724             Grooming Goal 1 (OT)    Activity/Device (Grooming Goal 1, OT) oral care;wash face, hands  -AS      Cherry Log (Grooming Goal 1, OT) set-up required;tactile cues required;verbal cues required;minimum assist (75% or more patient effort)  -AS      Time Frame (Grooming Goal 1, OT) long term goal (LTG);by discharge  -AS      Progress/Outcome (Grooming Goal 1, OT) goal not met  -AS         Self-Feeding Goal 1 (OT)    Activity/Assistive Device (Self-Feeding Goal 1, OT) self-feeding skills, all  -AS      Cherry Log Level/Cues Needed (Self-Feeding Goal 1, OT) set-up required;supervision required;tactile cues required;verbal cues required  -AS      Time Frame (Self-Feeding Goal 1, OT) long term goal (LTG);by discharge  -AS      Progress/Outcomes (Self-Feeding Goal 1, OT) goal met  -AS        User Key  (r) = Recorded By, (t) = Taken By, (c) = Cosigned By    Initials Name Provider Type Discipline    AS Callie Masters, OT Occupational Therapist OT                  OT Discharge Summary  Anticipated Discharge Disposition (OT): home with home health, skilled nursing facility (SNF) (home with 24/7 and  vs SNF)  Reason for  Discharge: Per MD order, Discharge from facility  Outcomes Achieved: Patient able to partially acheive established goals (pt d/c before all goals met)  Discharge Destination: Home with home health      Callie Masters OT  5/12/2018

## 2018-05-14 LAB
BACTERIA SPEC AEROBE CULT: NORMAL
BACTERIA SPEC AEROBE CULT: NORMAL

## 2018-05-15 RX ORDER — TOLTERODINE 2 MG/1
2 CAPSULE, EXTENDED RELEASE ORAL DAILY
Qty: 30 CAPSULE | Refills: 5 | Status: SHIPPED | OUTPATIENT
Start: 2018-05-15 | End: 2018-05-16 | Stop reason: CLARIF

## 2018-06-22 ENCOUNTER — TELEPHONE (OUTPATIENT)
Dept: FAMILY MEDICINE CLINIC | Facility: CLINIC | Age: 78
End: 2018-06-22

## 2018-06-22 NOTE — TELEPHONE ENCOUNTER
Mi with Home Health called and said she went to change the catheter in the pt and her urine is dark, foul smelling, and has a lot of sediment in it.     Mi is asking if a UA needs to be done on patient.     Thank you.

## 2018-06-25 ENCOUNTER — TELEPHONE (OUTPATIENT)
Dept: FAMILY MEDICINE CLINIC | Facility: CLINIC | Age: 78
End: 2018-06-25

## 2018-06-25 RX ORDER — LINEZOLID 600 MG/1
600 TABLET, FILM COATED ORAL EVERY 12 HOURS SCHEDULED
Qty: 20 TABLET | Refills: 0 | Status: SHIPPED | OUTPATIENT
Start: 2018-06-25 | End: 2018-07-30

## 2018-06-25 NOTE — TELEPHONE ENCOUNTER
Home health Araceli was called at [phone] regarding her urine culture positive for MRSA.  She was started on Zyvox 600 mg po bid x 10 days hold Remeron and use special diet while taking it.  She is allergic to sulfa, has ckd, and Vancomycin KELLIE is 2 so vancomycin will not cover it.

## 2018-07-19 ENCOUNTER — TELEPHONE (OUTPATIENT)
Dept: FAMILY MEDICINE CLINIC | Facility: CLINIC | Age: 78
End: 2018-07-19

## 2018-07-19 NOTE — TELEPHONE ENCOUNTER
"INDER CALLED ABOUT THE RECENT MEDICATION CHANGE FOR PATIENT. IT IS NOT WORKING AND SHE IS \"VERY WILD\".  SHE NEEDS TO TALK TO YOU.    THANK YOU  "

## 2018-07-20 ENCOUNTER — TELEPHONE (OUTPATIENT)
Dept: FAMILY MEDICINE CLINIC | Facility: CLINIC | Age: 78
End: 2018-07-20

## 2018-07-20 NOTE — TELEPHONE ENCOUNTER
Home health was called regarding the patient.  She is having bladder spasms and foul odor with urine and a discharge.  Will get a urinalysis and culture, cbc, and cmp.  Per home health she hasn't restarted her Remeron since taking the Zyvox.  Will start the Remeron back and see if it helps.

## 2018-07-23 ENCOUNTER — TELEPHONE (OUTPATIENT)
Dept: FAMILY MEDICINE CLINIC | Facility: CLINIC | Age: 78
End: 2018-07-23

## 2018-07-23 RX ORDER — LEVOFLOXACIN 250 MG/1
250 TABLET ORAL DAILY
Qty: 10 TABLET | Refills: 0 | Status: SHIPPED | OUTPATIENT
Start: 2018-07-23

## 2018-07-23 RX ORDER — POTASSIUM CHLORIDE 750 MG/1
10 TABLET, EXTENDED RELEASE ORAL DAILY
Qty: 30 TABLET | Refills: 0 | Status: SHIPPED | OUTPATIENT
Start: 2018-07-23

## 2018-07-23 NOTE — TELEPHONE ENCOUNTER
Labs discussed with home health nurse.  As I was talking to her, the urine culture results came back and it is sensitive to Levaquin.  Advised she needs to increase her fluid intake due to being slightly dehydrated.  Also her potassium was replaced.

## 2018-07-23 NOTE — TELEPHONE ENCOUNTER
Home Health pt of Dr. Kirk's:    Lindsey with Home Health called and said they received orders on Saturday lab work and for them to call with the results on Monday.     Her UA came back with 3+ bacteria and 2+ blood.     They also had a CBC and CMP; pt has had increased confusion over the weekend.     Can call [phone] with new orders; can speak to Lindsey or Leilani.    Thank you.

## 2018-07-30 ENCOUNTER — TELEPHONE (OUTPATIENT)
Dept: FAMILY MEDICINE CLINIC | Facility: CLINIC | Age: 78
End: 2018-07-30

## 2018-07-30 RX ORDER — MIRTAZAPINE 15 MG/1
15 TABLET, FILM COATED ORAL NIGHTLY
Qty: 30 TABLET | Refills: 5 | Status: SHIPPED | OUTPATIENT
Start: 2018-07-30

## 2018-07-30 NOTE — TELEPHONE ENCOUNTER
Spoke with home health nurse Jaimee about the granddaughters concerns.  Patient cried all weekend and granddaughter was concerned it was the Xanax.  Patient was started on the Xanax in the hospital in May and has not had any problems to my knowledge.  Unfortunately patient is house bound and cannot come to office.  She has recently been treated for a UTI and has underlying dementia that is just going to get worse.  Will leave meds as is for now, she is also supposed to be taking Remeron at night.

## 2018-09-07 NOTE — TELEPHONE ENCOUNTER
DAUGHTER OF JUSTIN FLORES =INDER KYLE  HAS CALLED ASKING IF DR HERNDON WILL TAKE OVER CARE OF MOTHER ....SHE IS BEDRIDDEN PT OF DR RAE'S..SHE HAS BEEN TREATED BY THE [facility] FROM ORDERS OF DR RAE..THEY ARE ASKING IF DR HERNDON WILL TAKE HER?  CALL INDER

## 2018-09-07 NOTE — TELEPHONE ENCOUNTER
The patient will need to be seen in the practice for continuation of care. If she not able to come into the practice, they may consider [facility] as an option where someone will come to their home.

## 2018-09-10 NOTE — TELEPHONE ENCOUNTER
Called patient daughter, per daughter Carmita pt is not mobile to be able to come in for an appt.   Daughter wanted to get someone that could help with the orders for home health.    Per message that was sent, pt could contact [facility] since she was not mobile, and could get someone from there to come see her.       Daughter wanted to find someone local to make an appt with, and wanted to wait on making an appt with a resident.

## 2018-09-10 NOTE — PROGRESS NOTES
I called [facility]  Regarding Tessy Gonzalez. They had called needing new orders from Dr Kirk, I told them that Dr Kirk is no longer with [facility] and that Mrs Gonzalez would need to be seen to have new orders written by another doctor. Her daughter said she is bed ridden and can not  come in,  I spoke to the Home Health nurse and let her know that I was instructed by my  to have them contact [facility] to go to Mrs. Bosch home if she is unable to come into the office.

## 2018-09-12 NOTE — TELEPHONE ENCOUNTER
I can only sign orders for 30 days(September 25 will be last day) for Dr. Kirk for home health. After that, she will need someone that can resume her care. If the patient cannot leave the home, her best option is the [facility] that does home visits.      This document has been electronically signed by Cyndi Rodriguez MD on September 12, 2018 11:15 AM

## 2018-10-18 NOTE — TELEPHONE ENCOUNTER
I called patient to schedule and appointment for a med refill.  No answer, left message.    Was a Reagan Carmichael

## 2019-01-01 NOTE — ADDENDUM NOTE
Addended by: LEONARD RAE on: 8/17/2018 04:00 PM     Modules accepted: Orders     pt under general anesthesia

## 2024-12-04 NOTE — TELEPHONE ENCOUNTER
[facility] NURSE CALLED  PATIENT HAS NOT HAD A BM IN OVER A WEEK. SHE HAS NO TAKEN ANYTHING FOR THE PROBLEM EITHER.    THANK YOU   1% lidocaine